# Patient Record
Sex: FEMALE | Race: WHITE | NOT HISPANIC OR LATINO | Employment: OTHER | URBAN - NONMETROPOLITAN AREA
[De-identification: names, ages, dates, MRNs, and addresses within clinical notes are randomized per-mention and may not be internally consistent; named-entity substitution may affect disease eponyms.]

---

## 2018-01-15 ENCOUNTER — TRANSCRIBE ORDERS (OUTPATIENT)
Dept: ADMINISTRATIVE | Facility: HOSPITAL | Age: 68
End: 2018-01-15

## 2018-01-15 DIAGNOSIS — R41.3 MEMORY LOSS: Primary | ICD-10-CM

## 2018-01-16 ENCOUNTER — APPOINTMENT (OUTPATIENT)
Dept: MRI IMAGING | Facility: HOSPITAL | Age: 68
End: 2018-01-16
Attending: PSYCHIATRY & NEUROLOGY

## 2018-01-19 ENCOUNTER — HOSPITAL ENCOUNTER (OUTPATIENT)
Dept: MRI IMAGING | Facility: HOSPITAL | Age: 68
Discharge: HOME | End: 2018-01-19
Attending: PSYCHIATRY & NEUROLOGY | Admitting: PSYCHIATRY & NEUROLOGY

## 2018-01-19 DIAGNOSIS — R41.3 MEMORY LOSS: ICD-10-CM

## 2018-01-19 PROCEDURE — 70551 MRI BRAIN STEM W/O DYE: CPT

## 2018-01-19 PROCEDURE — 70551 MRI BRAIN STEM W/O DYE: CPT | Performed by: RADIOLOGY

## 2018-02-15 ENCOUNTER — APPOINTMENT (OUTPATIENT)
Dept: CT IMAGING | Facility: HOSPITAL | Age: 68
End: 2018-02-15

## 2018-02-15 ENCOUNTER — APPOINTMENT (OUTPATIENT)
Dept: GENERAL RADIOLOGY | Facility: HOSPITAL | Age: 68
End: 2018-02-15

## 2018-02-15 ENCOUNTER — HOSPITAL ENCOUNTER (EMERGENCY)
Facility: HOSPITAL | Age: 68
Discharge: HOME | End: 2018-02-16
Attending: EMERGENCY MEDICINE | Admitting: EMERGENCY MEDICINE

## 2018-02-15 DIAGNOSIS — R11.2 NON-INTRACTABLE VOMITING WITH NAUSEA, UNSPECIFIED VOMITING TYPE: Primary | ICD-10-CM

## 2018-02-15 PROCEDURE — 25010000002 ONDANSETRON PER 1 MG: Performed by: EMERGENCY MEDICINE

## 2018-02-15 PROCEDURE — 96374 THER/PROPH/DIAG INJ IV PUSH: CPT

## 2018-02-15 PROCEDURE — 36415 COLL VENOUS BLD VENIPUNCTURE: CPT

## 2018-02-15 PROCEDURE — 99284 EMERGENCY DEPT VISIT MOD MDM: CPT

## 2018-02-15 PROCEDURE — 93005 ELECTROCARDIOGRAM TRACING: CPT | Performed by: EMERGENCY MEDICINE

## 2018-02-15 PROCEDURE — 93010 ELECTROCARDIOGRAM REPORT: CPT | Performed by: INTERNAL MEDICINE

## 2018-02-15 PROCEDURE — 84484 ASSAY OF TROPONIN QUANT: CPT | Performed by: EMERGENCY MEDICINE

## 2018-02-15 PROCEDURE — 83605 ASSAY OF LACTIC ACID: CPT | Performed by: EMERGENCY MEDICINE

## 2018-02-15 PROCEDURE — 87040 BLOOD CULTURE FOR BACTERIA: CPT | Performed by: EMERGENCY MEDICINE

## 2018-02-15 PROCEDURE — 83690 ASSAY OF LIPASE: CPT | Performed by: EMERGENCY MEDICINE

## 2018-02-15 PROCEDURE — 85025 COMPLETE CBC W/AUTO DIFF WBC: CPT | Performed by: EMERGENCY MEDICINE

## 2018-02-15 PROCEDURE — 81001 URINALYSIS AUTO W/SCOPE: CPT | Performed by: EMERGENCY MEDICINE

## 2018-02-15 PROCEDURE — 96361 HYDRATE IV INFUSION ADD-ON: CPT

## 2018-02-15 PROCEDURE — 87804 INFLUENZA ASSAY W/OPTIC: CPT | Performed by: EMERGENCY MEDICINE

## 2018-02-15 PROCEDURE — 80053 COMPREHEN METABOLIC PANEL: CPT | Performed by: EMERGENCY MEDICINE

## 2018-02-15 PROCEDURE — 82150 ASSAY OF AMYLASE: CPT | Performed by: EMERGENCY MEDICINE

## 2018-02-15 PROCEDURE — 96375 TX/PRO/DX INJ NEW DRUG ADDON: CPT

## 2018-02-15 RX ORDER — PANTOPRAZOLE SODIUM 40 MG/10ML
40 INJECTION, POWDER, LYOPHILIZED, FOR SOLUTION INTRAVENOUS ONCE
Status: COMPLETED | OUTPATIENT
Start: 2018-02-15 | End: 2018-02-15

## 2018-02-15 RX ORDER — SODIUM CHLORIDE 9 MG/ML
INJECTION, SOLUTION INTRAVENOUS
Status: COMPLETED
Start: 2018-02-15 | End: 2018-02-16

## 2018-02-15 RX ORDER — SODIUM CHLORIDE 9 MG/ML
125 INJECTION, SOLUTION INTRAVENOUS CONTINUOUS
Status: DISCONTINUED | OUTPATIENT
Start: 2018-02-15 | End: 2018-02-16 | Stop reason: HOSPADM

## 2018-02-15 RX ORDER — ONDANSETRON 2 MG/ML
4 INJECTION INTRAMUSCULAR; INTRAVENOUS ONCE
Status: COMPLETED | OUTPATIENT
Start: 2018-02-15 | End: 2018-02-15

## 2018-02-15 RX ADMIN — SODIUM CHLORIDE 125 ML/HR: 9 INJECTION, SOLUTION INTRAVENOUS at 23:52

## 2018-02-15 RX ADMIN — PANTOPRAZOLE SODIUM 40 MG: 40 INJECTION, POWDER, FOR SOLUTION INTRAVENOUS at 23:52

## 2018-02-15 RX ADMIN — SODIUM CHLORIDE 1000 ML: 9 INJECTION, SOLUTION INTRAVENOUS at 23:52

## 2018-02-15 RX ADMIN — ONDANSETRON 4 MG: 2 INJECTION, SOLUTION INTRAMUSCULAR; INTRAVENOUS at 23:52

## 2018-02-16 ENCOUNTER — APPOINTMENT (OUTPATIENT)
Dept: CT IMAGING | Facility: HOSPITAL | Age: 68
End: 2018-02-16

## 2018-02-16 ENCOUNTER — APPOINTMENT (OUTPATIENT)
Dept: GENERAL RADIOLOGY | Facility: HOSPITAL | Age: 68
End: 2018-02-16

## 2018-02-16 VITALS
OXYGEN SATURATION: 97 % | HEIGHT: 60 IN | WEIGHT: 86 LBS | BODY MASS INDEX: 16.88 KG/M2 | DIASTOLIC BLOOD PRESSURE: 66 MMHG | SYSTOLIC BLOOD PRESSURE: 115 MMHG | RESPIRATION RATE: 16 BRPM | HEART RATE: 71 BPM | TEMPERATURE: 97.9 F

## 2018-02-16 LAB
ALBUMIN SERPL-MCNC: 4.6 G/DL (ref 3.4–4.8)
ALBUMIN/GLOB SERPL: 1.8 G/DL (ref 1.5–2.5)
ALP SERPL-CCNC: 82 U/L (ref 35–104)
ALT SERPL W P-5'-P-CCNC: 29 U/L (ref 10–36)
AMYLASE SERPL-CCNC: 42 U/L (ref 28–100)
ANION GAP SERPL CALCULATED.3IONS-SCNC: 13.8 MMOL/L (ref 3.6–11.2)
AST SERPL-CCNC: 30 U/L (ref 10–30)
BACTERIA UR QL AUTO: ABNORMAL /HPF
BASOPHILS # BLD AUTO: 0.03 10*3/MM3 (ref 0–0.3)
BASOPHILS NFR BLD AUTO: 0.3 % (ref 0–2)
BILIRUB SERPL-MCNC: 0.6 MG/DL (ref 0.2–1.8)
BILIRUB UR QL STRIP: NEGATIVE
BUN BLD-MCNC: 12 MG/DL (ref 7–21)
BUN/CREAT SERPL: 15.8 (ref 7–25)
CALCIUM SPEC-SCNC: 9.6 MG/DL (ref 7.7–10)
CHLORIDE SERPL-SCNC: 102 MMOL/L (ref 99–112)
CLARITY UR: ABNORMAL
CO2 SERPL-SCNC: 23.2 MMOL/L (ref 24.3–31.9)
COLOR UR: YELLOW
CREAT BLD-MCNC: 0.76 MG/DL (ref 0.43–1.29)
D-LACTATE SERPL-SCNC: 2.7 MMOL/L (ref 0.5–2)
DEPRECATED RDW RBC AUTO: 40.2 FL (ref 37–54)
EOSINOPHIL # BLD AUTO: 0 10*3/MM3 (ref 0–0.7)
EOSINOPHIL NFR BLD AUTO: 0 % (ref 0–7)
ERYTHROCYTE [DISTWIDTH] IN BLOOD BY AUTOMATED COUNT: 12.9 % (ref 11.5–14.5)
FLUAV AG NPH QL: NEGATIVE
FLUBV AG NPH QL IA: NEGATIVE
GFR SERPL CREATININE-BSD FRML MDRD: 76 ML/MIN/1.73
GLOBULIN UR ELPH-MCNC: 2.6 GM/DL
GLUCOSE BLD-MCNC: 143 MG/DL (ref 70–110)
GLUCOSE UR STRIP-MCNC: NEGATIVE MG/DL
HCT VFR BLD AUTO: 43.8 % (ref 37–47)
HGB BLD-MCNC: 14.4 G/DL (ref 12–16)
HGB UR QL STRIP.AUTO: ABNORMAL
HOLD SPECIMEN: NORMAL
HYALINE CASTS UR QL AUTO: ABNORMAL /LPF
IMM GRANULOCYTES # BLD: 0.01 10*3/MM3 (ref 0–0.03)
IMM GRANULOCYTES NFR BLD: 0.1 % (ref 0–0.5)
KETONES UR QL STRIP: ABNORMAL
LEUKOCYTE ESTERASE UR QL STRIP.AUTO: NEGATIVE
LIPASE SERPL-CCNC: 31 U/L (ref 13–60)
LYMPHOCYTES # BLD AUTO: 0.67 10*3/MM3 (ref 1–3)
LYMPHOCYTES NFR BLD AUTO: 5.9 % (ref 16–46)
MCH RBC QN AUTO: 28.6 PG (ref 27–33)
MCHC RBC AUTO-ENTMCNC: 32.9 G/DL (ref 33–37)
MCV RBC AUTO: 87.1 FL (ref 80–94)
MONOCYTES # BLD AUTO: 0.26 10*3/MM3 (ref 0.1–0.9)
MONOCYTES NFR BLD AUTO: 2.3 % (ref 0–12)
NEUTROPHILS # BLD AUTO: 10.33 10*3/MM3 (ref 1.4–6.5)
NEUTROPHILS NFR BLD AUTO: 91.4 % (ref 40–75)
NITRITE UR QL STRIP: NEGATIVE
OSMOLALITY SERPL CALC.SUM OF ELEC: 279.8 MOSM/KG (ref 273–305)
PH UR STRIP.AUTO: 8.5 [PH] (ref 5–8)
PLATELET # BLD AUTO: 228 10*3/MM3 (ref 130–400)
PMV BLD AUTO: 11.6 FL (ref 6–10)
POTASSIUM BLD-SCNC: 4 MMOL/L (ref 3.5–5.3)
PROT SERPL-MCNC: 7.2 G/DL (ref 6–8)
PROT UR QL STRIP: ABNORMAL
RBC # BLD AUTO: 5.03 10*6/MM3 (ref 4.2–5.4)
RBC # UR: ABNORMAL /HPF
REF LAB TEST METHOD: ABNORMAL
SODIUM BLD-SCNC: 139 MMOL/L (ref 135–153)
SP GR UR STRIP: 1.02 (ref 1–1.03)
SQUAMOUS #/AREA URNS HPF: ABNORMAL /HPF
TROPONIN I SERPL-MCNC: <0.006 NG/ML
UROBILINOGEN UR QL STRIP: ABNORMAL
WBC NRBC COR # BLD: 11.3 10*3/MM3 (ref 4.5–12.5)
WBC UR QL AUTO: ABNORMAL /HPF

## 2018-02-16 PROCEDURE — 71045 X-RAY EXAM CHEST 1 VIEW: CPT | Performed by: RADIOLOGY

## 2018-02-16 PROCEDURE — 87040 BLOOD CULTURE FOR BACTERIA: CPT | Performed by: EMERGENCY MEDICINE

## 2018-02-16 PROCEDURE — 74176 CT ABD & PELVIS W/O CONTRAST: CPT | Performed by: RADIOLOGY

## 2018-02-16 PROCEDURE — 36415 COLL VENOUS BLD VENIPUNCTURE: CPT

## 2018-02-16 PROCEDURE — 71045 X-RAY EXAM CHEST 1 VIEW: CPT

## 2018-02-16 PROCEDURE — 74176 CT ABD & PELVIS W/O CONTRAST: CPT

## 2018-02-16 RX ORDER — ONDANSETRON 4 MG/1
4 TABLET, ORALLY DISINTEGRATING ORAL EVERY 6 HOURS PRN
Qty: 12 TABLET | Refills: 0 | Status: ON HOLD | OUTPATIENT
Start: 2018-02-16 | End: 2021-09-06

## 2018-02-16 RX ORDER — ONDANSETRON 4 MG/1
4 TABLET, ORALLY DISINTEGRATING ORAL EVERY 6 HOURS PRN
Status: DISCONTINUED | OUTPATIENT
Start: 2018-02-16 | End: 2018-02-16 | Stop reason: HOSPADM

## 2018-02-16 RX ORDER — AMLODIPINE BESYLATE 10 MG/1
10 TABLET ORAL DAILY
Status: ON HOLD | COMMUNITY
End: 2021-09-06

## 2018-02-16 RX ORDER — DONEPEZIL HYDROCHLORIDE 5 MG/1
5 TABLET, FILM COATED ORAL NIGHTLY
Status: ON HOLD | COMMUNITY
End: 2021-09-06

## 2018-02-16 RX ADMIN — ONDANSETRON 4 MG: 4 TABLET, ORALLY DISINTEGRATING ORAL at 01:40

## 2018-02-16 NOTE — ED PROVIDER NOTES
Subjective   HPI Comments: Patient is a 67-year-old white female who suffers from dementia.  She is brought here by the daughter tonight with a complaint that she had onset at about 5 PM of nausea and vomiting, may have vomited 5-6 times.  The daughter states that the patient was started on Aricept today, which she took concurrently with Norvasc and Trentellix; the daughter thinks that taking these medications simultaneously is what caused the nausea and vomiting.  Patient does suffer from dementia and she is a poor historian.  She complains of feeling nauseated.  She denies any sort of pain.  She denies other symptoms or other complaints.  EMS administered 12 have milligrams of intravenous Phenergan in route to the hospital, patient reports that it has not helped.    Patient is a 67 y.o. female presenting with vomiting.   History provided by: History is mainly from the patient's daughter.  The patient contributes some as well.  Vomiting   The primary symptoms include nausea and vomiting. Primary symptoms do not include fever, abdominal pain, diarrhea, melena, hematemesis, jaundice, hematochezia, dysuria, myalgias or arthralgias.   The illness does not include chills or back pain.       Review of Systems   Constitutional: Negative for chills, diaphoresis and fever.   HENT: Negative for ear pain and sore throat.    Eyes: Negative for photophobia, pain and redness.   Respiratory: Negative for shortness of breath and wheezing.    Cardiovascular: Negative for chest pain and palpitations.   Gastrointestinal: Positive for nausea and vomiting. Negative for abdominal pain, blood in stool, diarrhea, hematemesis, hematochezia, jaundice and melena.   Endocrine: Negative for polydipsia and polyphagia.   Genitourinary: Negative for difficulty urinating, dysuria and flank pain.   Musculoskeletal: Negative for arthralgias, back pain, myalgias, neck pain and neck stiffness.   Skin: Negative for color change and pallor.    Neurological: Negative for seizures, syncope, speech difficulty and headaches.   Psychiatric/Behavioral: Negative for confusion.   All other systems reviewed and are negative.      No past medical history on file.    No Known Allergies    Past Surgical History:   Procedure Laterality Date   • BREAST AUGMENTATION     •  SECTION     • EYE SURGERY Right 2004    prostetic right eye   • GALLBLADDER SURGERY     • HYSTERECTOMY     • TONSILLECTOMY         No family history on file.    Social History     Social History   • Marital status:      Spouse name: N/A   • Number of children: N/A   • Years of education: N/A     Social History Main Topics   • Smoking status: Not on file   • Smokeless tobacco: Not on file   • Alcohol use Not on file   • Drug use: Not on file   • Sexual activity: Not on file     Other Topics Concern   • Not on file     Social History Narrative           Objective   Physical Exam   Constitutional: No distress.   A very pleasant, thin white female who is in no apparent distress.   HENT:   Head: Normocephalic and atraumatic.   Eyes: Right eye exhibits no discharge. Left eye exhibits no discharge. No scleral icterus.   Neck: Normal range of motion. Neck supple. No tracheal deviation present.   Cardiovascular: Normal rate, regular rhythm and intact distal pulses.    Pulmonary/Chest: Effort normal and breath sounds normal. No respiratory distress. She has no wheezes. She has no rales.   Abdominal: Soft. Bowel sounds are normal. She exhibits no distension. There is tenderness (There is only mild epigastric tenderness to palpation.  No other tenderness.  No acute peritoneal signs.). There is no rebound and no guarding.   Musculoskeletal: Normal range of motion. She exhibits no tenderness or deformity.   Neurological: She is alert. She exhibits normal muscle tone.   Patient is oriented to person.  Not quite to place or situation.  Not oriented to time.  This is her usual mental status per the  daughter.   Skin: Skin is warm and dry. She is not diaphoretic. No pallor.   Psychiatric: Her behavior is normal.   Vitals reviewed.      Procedures  EKG shows sinus rhythm with a rate of 74.  No apparent acute ischemia.  XR Chest 1 View   ED Interpretation   No apparent acute disease pending radiologist.      CT Abdomen Pelvis Without Contrast   ED Interpretation   Per virtual radiology, slightly limited evaluation with colonic   decompression.  Suspect mild right-sided and transverse colitis.    No other acute abnormalities are described.        Results for orders placed or performed during the hospital encounter of 02/15/18   Influenza Antigen, Rapid - Swab, Nasopharynx   Result Value Ref Range    Influenza A Ag, EIA Negative Negative    Influenza B Ag, EIA Negative Negative   Comprehensive Metabolic Panel   Result Value Ref Range    Glucose 143 (H) 70 - 110 mg/dL    BUN 12 7 - 21 mg/dL    Creatinine 0.76 0.43 - 1.29 mg/dL    Sodium 139 135 - 153 mmol/L    Potassium 4.0 3.5 - 5.3 mmol/L    Chloride 102 99 - 112 mmol/L    CO2 23.2 (L) 24.3 - 31.9 mmol/L    Calcium 9.6 7.7 - 10.0 mg/dL    Total Protein 7.2 6.0 - 8.0 g/dL    Albumin 4.60 3.40 - 4.80 g/dL    ALT (SGPT) 29 10 - 36 U/L    AST (SGOT) 30 10 - 30 U/L    Alkaline Phosphatase 82 35 - 104 U/L    Total Bilirubin 0.6 0.2 - 1.8 mg/dL    eGFR Non African Amer 76 >60 mL/min/1.73    Globulin 2.6 gm/dL    A/G Ratio 1.8 1.5 - 2.5 g/dL    BUN/Creatinine Ratio 15.8 7.0 - 25.0    Anion Gap 13.8 (H) 3.6 - 11.2 mmol/L   Lipase   Result Value Ref Range    Lipase 31 13 - 60 U/L   Amylase   Result Value Ref Range    Amylase 42 28 - 100 U/L   Urinalysis With / Culture If Indicated - Urine, Catheter   Result Value Ref Range    Color, UA Yellow Yellow, Straw    Appearance, UA Turbid (A) Clear    pH, UA 8.5 (H) 5.0 - 8.0    Specific Gravity, UA 1.022 1.005 - 1.030    Glucose, UA Negative Negative    Ketones, UA 40 mg/dL (2+) (A) Negative    Bilirubin, UA Negative Negative     Blood, UA Trace (A) Negative    Protein,  mg/dL (2+) (A) Negative    Leuk Esterase, UA Negative Negative    Nitrite, UA Negative Negative    Urobilinogen, UA 1.0 E.U./dL 0.2 - 1.0 E.U./dL   Troponin   Result Value Ref Range    Troponin I <0.006 <=0.040 ng/mL   Lactic Acid, Plasma   Result Value Ref Range    Lactate 2.7 (C) 0.5 - 2.0 mmol/L   CBC Auto Differential   Result Value Ref Range    WBC 11.30 4.50 - 12.50 10*3/mm3    RBC 5.03 4.20 - 5.40 10*6/mm3    Hemoglobin 14.4 12.0 - 16.0 g/dL    Hematocrit 43.8 37.0 - 47.0 %    MCV 87.1 80.0 - 94.0 fL    MCH 28.6 27.0 - 33.0 pg    MCHC 32.9 (L) 33.0 - 37.0 g/dL    RDW 12.9 11.5 - 14.5 %    RDW-SD 40.2 37.0 - 54.0 fl    MPV 11.6 (H) 6.0 - 10.0 fL    Platelets 228 130 - 400 10*3/mm3    Neutrophil % 91.4 (H) 40.0 - 75.0 %    Lymphocyte % 5.9 (L) 16.0 - 46.0 %    Monocyte % 2.3 0.0 - 12.0 %    Eosinophil % 0.0 0.0 - 7.0 %    Basophil % 0.3 0.0 - 2.0 %    Immature Grans % 0.1 0.0 - 0.5 %    Neutrophils, Absolute 10.33 (H) 1.40 - 6.50 10*3/mm3    Lymphocytes, Absolute 0.67 (L) 1.00 - 3.00 10*3/mm3    Monocytes, Absolute 0.26 0.10 - 0.90 10*3/mm3    Eosinophils, Absolute 0.00 0.00 - 0.70 10*3/mm3    Basophils, Absolute 0.03 0.00 - 0.30 10*3/mm3    Immature Grans, Absolute 0.01 0.00 - 0.03 10*3/mm3   Urinalysis, Microscopic Only - Urine, Clean Catch   Result Value Ref Range    RBC, UA 3-6 (A) None Seen, 0-2 /HPF    WBC, UA 0-2 None Seen, 0-2 /HPF    Bacteria, UA Trace (A) None Seen /HPF    Squamous Epithelial Cells, UA 3-6 (A) None Seen, 0-2 /HPF    Hyaline Casts, UA None Seen None Seen /LPF    Methodology Manual Light Microscopy    Osmolality, Calculated   Result Value Ref Range    Osmolality Calc 279.8 273.0 - 305.0 mOsm/kg              ED Course  ED Course   Comment By Time   Patient is resting comfortably, voices that she feels much better now.  Her daughter advises that her mother feels much better, looks much better, has returned to normal, and would like to take  her home.  We discussed the test results and her plan of care.  The daughter voices understanding and agreement. Edis Decker MD 02/16 0117                  [facility]    Final diagnoses:   Non-intractable vomiting with nausea, unspecified vomiting type             Please note that portions of this note were completed with a voice recognition program. Efforts were made to edit the dictations, but occasionally words are mistranscribed.       Edis Decker MD  02/16/18 0144

## 2018-02-16 NOTE — ED NOTES
Pt being transported to car by ER tech and asks for zofran to go. Dr. Decker notifed and VORB to give Zofran OTD to go x2. Packaged and sent with Pt. Pt and Pt family verbalized understanding of medication administration.      Opal Bowens RN  02/16/18 [id number]

## 2018-02-16 NOTE — DISCHARGE INSTRUCTIONS
Nausea and Vomiting, Adult  Feeling sick to your stomach (nausea) means that your stomach is upset or you feel like you have to throw up (vomit). Feeling more and more sick to your stomach can lead to throwing up. Throwing up happens when food and liquid from your stomach are thrown up and out the mouth. Throwing up can make you feel weak and cause you to get dehydrated. Dehydration can make you tired and thirsty, make you have a dry mouth, and make it so you pee (urinate) less often. Older adults and people with other diseases or a weak defense system (immune system) are at higher risk for dehydration. If you feel sick to your stomach or if you throw up, it is important to follow instructions from your doctor about how to take care of yourself.  Follow these instructions at home:  Eating and drinking   Follow these instructions as told by your doctor:  · Take an oral rehydration solution (ORS). This is a drink that is sold at pharmacies and stores.  · Drink clear fluids in small amounts as you are able, such as:  ¨ Water.  ¨ Ice chips.  ¨ Diluted fruit juice.  ¨ Low-calorie sports drinks.  · Eat bland, easy-to-digest foods in small amounts as you are able, such as:  ¨ Bananas.  ¨ Applesauce.  ¨ Rice.  ¨ Low-fat (lean) meats.  ¨ Toast.  ¨ Crackers.  · Avoid fluids that have a lot of sugar or caffeine in them.  · Avoid alcohol.  · Avoid spicy or fatty foods.  General instructions   · Drink enough fluid to keep your pee (urine) clear or pale yellow.  · Wash your hands often. If you cannot use soap and water, use hand .  · Make sure that all people in your home wash their hands well and often.  · Take over-the-counter and prescription medicines only as told by your doctor.  · Rest at home while you get better.  · Watch your condition for any changes.  · Breathe slowly and deeply when you feel sick to your stomach.  · Keep all follow-up visits as told by your doctor. This is important.  Contact a doctor  if:  · You have a fever.  · You cannot keep fluids down.  · Your symptoms get worse.  · You have new symptoms.  · You feel sick to your stomach for more than two days.  · You feel light-headed or dizzy.  · You have a headache.  · You have muscle cramps.  Get help right away if:  · You have pain in your chest, neck, arm, or jaw.  · You feel very weak or you pass out (faint).  · You throw up again and again.  · You see blood in your throw-up.  · Your throw-up looks like black coffee grounds.  · You have bloody or black poop (stools) or poop that look like tar.  · You have a very bad headache, a stiff neck, or both.  · You have a rash.  · You have very bad pain, cramping, or bloating in your belly (abdomen).  · You have trouble breathing.  · You are breathing very quickly.  · Your heart is beating very quickly.  · Your skin feels cold and clammy.  · You feel confused.  · You have pain when you pee.  · You have signs of dehydration, such as:  ¨ Dark pee, hardly any pee, or no pee.  ¨ Cracked lips.  ¨ Dry mouth.  ¨ Sunken eyes.  ¨ Sleepiness.  ¨ Weakness.  These symptoms may be an emergency. Do not wait to see if the symptoms will go away. Get medical help right away. Call your local emergency services (911 in the [place] Do not drive yourself to the hospital.  This information is not intended to replace advice given to you by your health care provider. Make sure you discuss any questions you have with your health care provider.  Document Released: 06/05/2009 Document Revised: 07/07/2017 Document Reviewed: 08/23/2016  [employer] Interactive Patient Education © 2017 [employer]  Home in care of daughter.  Rest, plenty of fluids.  Advance diet as tolerated.  Zofran as directed as needed.  Follow-up with Abraham Garza in the office in 2 days.  Return the emergency Department right away if symptoms worsen/any problems.    Hypertension  Hypertension, commonly called high blood pressure, is when the force of blood pumping  "through the arteries is too strong. The arteries are the blood vessels that carry blood from the heart throughout the body. Hypertension forces the heart to work harder to pump blood and may cause arteries to become narrow or stiff. Having untreated or uncontrolled hypertension can cause heart attacks, strokes, kidney disease, and other problems.  A blood pressure reading consists of a higher number over a lower number. Ideally, your blood pressure should be below 120/80. The first (\"top\") number is called the systolic pressure. It is a measure of the pressure in your arteries as your heart beats. The second (\"bottom\") number is called the diastolic pressure. It is a measure of the pressure in your arteries as the heart relaxes.  What are the causes?  The cause of this condition is not known.  What increases the risk?  Some risk factors for high blood pressure are under your control. Others are not.  Factors you can change   · Smoking.  · Having type 2 diabetes mellitus, high cholesterol, or both.  · Not getting enough exercise or physical activity.  · Being overweight.  · Having too much fat, sugar, calories, or salt (sodium) in your diet.  · Drinking too much alcohol.  Factors that are difficult or impossible to change   · Having chronic kidney disease.  · Having a family history of high blood pressure.  · Age. Risk increases with age.  · Race. You may be at higher risk if you are -American.  · Gender. Men are at higher risk than women before age 45. After age 65, women are at higher risk than men.  · Having obstructive sleep apnea.  · Stress.  What are the signs or symptoms?  Extremely high blood pressure (hypertensive crisis) may cause:  · Headache.  · Anxiety.  · Shortness of breath.  · Nosebleed.  · Nausea and vomiting.  · Severe chest pain.  · Jerky movements you cannot control (seizures).  How is this diagnosed?  This condition is diagnosed by measuring your blood pressure while you are seated, with " your arm resting on a surface. The cuff of the blood pressure monitor will be placed directly against the skin of your upper arm at the level of your heart. It should be measured at least twice using the same arm. Certain conditions can cause a difference in blood pressure between your right and left arms.  Certain factors can cause blood pressure readings to be lower or higher than normal (elevated) for a short period of time:  · When your blood pressure is higher when you are in a health care provider's office than when you are at home, this is called white coat hypertension. Most people with this condition do not need medicines.  · When your blood pressure is higher at home than when you are in a health care provider's office, this is called masked hypertension. Most people with this condition may need medicines to control blood pressure.  If you have a high blood pressure reading during one visit or you have normal blood pressure with other risk factors:  · You may be asked to return on a different day to have your blood pressure checked again.  · You may be asked to monitor your blood pressure at home for 1 week or longer.  If you are diagnosed with hypertension, you may have other blood or imaging tests to help your health care provider understand your overall risk for other conditions.  How is this treated?  This condition is treated by making healthy lifestyle changes, such as eating healthy foods, exercising more, and reducing your alcohol intake. Your health care provider may prescribe medicine if lifestyle changes are not enough to get your blood pressure under control, and if:  · Your systolic blood pressure is above 130.  · Your diastolic blood pressure is above 80.  Your personal target blood pressure may vary depending on your medical conditions, your age, and other factors.  Follow these instructions at home:  Eating and drinking   · Eat a diet that is high in fiber and potassium, and low in sodium,  added sugar, and fat. An example eating plan is called the DASH (Dietary Approaches to Stop Hypertension) diet. To eat this way:  ¨ Eat plenty of fresh fruits and vegetables. Try to fill half of your plate at each meal with fruits and vegetables.  ¨ Eat whole grains, such as whole wheat pasta, brown rice, or whole grain bread. Fill about one quarter of your plate with whole grains.  ¨ Eat or drink low-fat dairy products, such as skim milk or low-fat yogurt.  ¨ Avoid fatty cuts of meat, processed or cured meats, and poultry with skin. Fill about one quarter of your plate with lean proteins, such as fish, chicken without skin, beans, eggs, and tofu.  ¨ Avoid premade and processed foods. These tend to be higher in sodium, added sugar, and fat.  · Reduce your daily sodium intake. Most people with hypertension should eat less than 1,500 mg of sodium a day.  · Limit alcohol intake to no more than 1 drink a day for nonpregnant women and 2 drinks a day for men. One drink equals 12 oz of beer, 5 oz of wine, or 1½ oz of hard liquor.  Lifestyle   · Work with your health care provider to maintain a healthy body weight or to lose weight. Ask what an ideal weight is for you.  · Get at least 30 minutes of exercise that causes your heart to beat faster (aerobic exercise) most days of the week. Activities may include walking, swimming, or biking.  · Include exercise to strengthen your muscles (resistance exercise), such as pilates or lifting weights, as part of your weekly exercise routine. Try to do these types of exercises for 30 minutes at least 3 days a week.  · Do not use any products that contain nicotine or tobacco, such as cigarettes and e-cigarettes. If you need help quitting, ask your health care provider.  · Monitor your blood pressure at home as told by your health care provider.  · Keep all follow-up visits as told by your health care provider. This is important.  Medicines   · Take over-the-counter and prescription  medicines only as told by your health care provider. Follow directions carefully. Blood pressure medicines must be taken as prescribed.  · Do not skip doses of blood pressure medicine. Doing this puts you at risk for problems and can make the medicine less effective.  · Ask your health care provider about side effects or reactions to medicines that you should watch for.  Contact a health care provider if:  · You think you are having a reaction to a medicine you are taking.  · You have headaches that keep coming back (recurring).  · You feel dizzy.  · You have swelling in your ankles.  · You have trouble with your vision.  Get help right away if:  · You develop a severe headache or confusion.  · You have unusual weakness or numbness.  · You feel faint.  · You have severe pain in your chest or abdomen.  · You vomit repeatedly.  · You have trouble breathing.  Summary  · Hypertension is when the force of blood pumping through your arteries is too strong. If this condition is not controlled, it may put you at risk for serious complications.  · Your personal target blood pressure may vary depending on your medical conditions, your age, and other factors. For most people, a normal blood pressure is less than 120/80.  · Hypertension is treated with lifestyle changes, medicines, or a combination of both. Lifestyle changes include weight loss, eating a healthy, low-sodium diet, exercising more, and limiting alcohol.  This information is not intended to replace advice given to you by your health care provider. Make sure you discuss any questions you have with your health care provider.  Document Released: 12/18/2006 Document Revised: 11/15/2017 Document Reviewed: 11/15/2017  [employer] Interactive Patient Education © 2017 [employer]

## 2018-02-21 LAB
BACTERIA SPEC AEROBE CULT: NORMAL
BACTERIA SPEC AEROBE CULT: NORMAL

## 2018-11-07 ENCOUNTER — TRANSCRIBE ORDERS (OUTPATIENT)
Dept: ADMINISTRATIVE | Facility: HOSPITAL | Age: 68
End: 2018-11-07

## 2018-11-07 DIAGNOSIS — Z87.891 PERSONAL HISTORY OF NICOTINE DEPENDENCE: Primary | ICD-10-CM

## 2018-11-14 ENCOUNTER — HOSPITAL ENCOUNTER (OUTPATIENT)
Dept: CT IMAGING | Facility: HOSPITAL | Age: 68
Discharge: HOME | End: 2018-11-14
Admitting: NURSE PRACTITIONER

## 2018-11-14 DIAGNOSIS — Z87.891 PERSONAL HISTORY OF NICOTINE DEPENDENCE: ICD-10-CM

## 2018-11-14 PROCEDURE — G0297 LDCT FOR LUNG CA SCREEN: HCPCS | Performed by: RADIOLOGY

## 2018-11-14 PROCEDURE — G0297 LDCT FOR LUNG CA SCREEN: HCPCS

## 2018-12-14 ENCOUNTER — APPOINTMENT (OUTPATIENT)
Dept: MAMMOGRAPHY | Facility: HOSPITAL | Age: 68
End: 2018-12-14

## 2018-12-14 ENCOUNTER — APPOINTMENT (OUTPATIENT)
Dept: BONE DENSITY | Facility: HOSPITAL | Age: 68
End: 2018-12-14

## 2019-01-15 ENCOUNTER — HOSPITAL ENCOUNTER (OUTPATIENT)
Dept: BONE DENSITY | Facility: HOSPITAL | Age: 69
Discharge: HOME | End: 2019-01-15

## 2019-01-15 ENCOUNTER — HOSPITAL ENCOUNTER (OUTPATIENT)
Dept: MAMMOGRAPHY | Facility: HOSPITAL | Age: 69
Discharge: HOME | End: 2019-01-15
Admitting: NURSE PRACTITIONER

## 2019-01-15 DIAGNOSIS — Z12.39 SCREENING BREAST EXAMINATION: ICD-10-CM

## 2019-01-15 DIAGNOSIS — M81.0 AGE-RELATED OSTEOPOROSIS WITHOUT CURRENT PATHOLOGICAL FRACTURE: ICD-10-CM

## 2019-01-15 PROCEDURE — 77063 BREAST TOMOSYNTHESIS BI: CPT

## 2019-01-15 PROCEDURE — 77067 SCR MAMMO BI INCL CAD: CPT | Performed by: RADIOLOGY

## 2019-01-15 PROCEDURE — 77063 BREAST TOMOSYNTHESIS BI: CPT | Performed by: RADIOLOGY

## 2019-01-15 PROCEDURE — 77067 SCR MAMMO BI INCL CAD: CPT

## 2019-06-10 ENCOUNTER — LAB (OUTPATIENT)
Dept: LAB | Facility: HOSPITAL | Age: 69
End: 2019-06-10

## 2019-06-10 ENCOUNTER — TRANSCRIBE ORDERS (OUTPATIENT)
Dept: ADMINISTRATIVE | Facility: HOSPITAL | Age: 69
End: 2019-06-10

## 2019-06-10 DIAGNOSIS — F32.9 DEPRESSION, REACTIVE: ICD-10-CM

## 2019-06-10 DIAGNOSIS — F32.9 DEPRESSION, REACTIVE: Primary | ICD-10-CM

## 2019-06-10 LAB
HCYS SERPL-MCNC: 9.1 UMOL/L (ref 0–15)
TSH SERPL DL<=0.05 MIU/L-ACNC: 1.65 MIU/ML (ref 0.27–4.2)
VIT B12 BLD-MCNC: >2000 PG/ML (ref 211–946)

## 2019-06-10 PROCEDURE — 82607 VITAMIN B-12: CPT

## 2019-06-10 PROCEDURE — 84443 ASSAY THYROID STIM HORMONE: CPT

## 2019-06-10 PROCEDURE — 36415 COLL VENOUS BLD VENIPUNCTURE: CPT

## 2019-06-10 PROCEDURE — 83090 ASSAY OF HOMOCYSTEINE: CPT

## 2019-09-01 ENCOUNTER — APPOINTMENT (OUTPATIENT)
Dept: GENERAL RADIOLOGY | Facility: HOSPITAL | Age: 69
End: 2019-09-01

## 2019-09-01 ENCOUNTER — HOSPITAL ENCOUNTER (EMERGENCY)
Facility: HOSPITAL | Age: 69
Discharge: HOME | End: 2019-09-01
Attending: EMERGENCY MEDICINE | Admitting: EMERGENCY MEDICINE

## 2019-09-01 VITALS
SYSTOLIC BLOOD PRESSURE: 116 MMHG | HEART RATE: 71 BPM | OXYGEN SATURATION: 93 % | BODY MASS INDEX: 16.3 KG/M2 | DIASTOLIC BLOOD PRESSURE: 65 MMHG | WEIGHT: 83 LBS | HEIGHT: 60 IN | RESPIRATION RATE: 20 BRPM | TEMPERATURE: 98.4 F

## 2019-09-01 DIAGNOSIS — R09.89 CHOKING EPISODE: Primary | ICD-10-CM

## 2019-09-01 LAB
ALBUMIN SERPL-MCNC: 4.12 G/DL (ref 3.5–5.2)
ALBUMIN/GLOB SERPL: 1.5 G/DL
ALP SERPL-CCNC: 79 U/L (ref 39–117)
ALT SERPL W P-5'-P-CCNC: 26 U/L (ref 1–33)
ANION GAP SERPL CALCULATED.3IONS-SCNC: 13 MMOL/L (ref 5–15)
AST SERPL-CCNC: 22 U/L (ref 1–32)
BASOPHILS # BLD AUTO: 0.02 10*3/MM3 (ref 0–0.2)
BASOPHILS NFR BLD AUTO: 0.4 % (ref 0–1.5)
BILIRUB SERPL-MCNC: 0.4 MG/DL (ref 0.2–1.2)
BUN BLD-MCNC: 17 MG/DL (ref 8–23)
BUN/CREAT SERPL: 21.3 (ref 7–25)
CALCIUM SPEC-SCNC: 9.4 MG/DL (ref 8.6–10.5)
CHLORIDE SERPL-SCNC: 107 MMOL/L (ref 98–107)
CO2 SERPL-SCNC: 24 MMOL/L (ref 22–29)
CREAT BLD-MCNC: 0.8 MG/DL (ref 0.57–1)
DEPRECATED RDW RBC AUTO: 43.2 FL (ref 37–54)
EOSINOPHIL # BLD AUTO: 0.04 10*3/MM3 (ref 0–0.4)
EOSINOPHIL NFR BLD AUTO: 0.8 % (ref 0.3–6.2)
ERYTHROCYTE [DISTWIDTH] IN BLOOD BY AUTOMATED COUNT: 13.3 % (ref 12.3–15.4)
GFR SERPL CREATININE-BSD FRML MDRD: 72 ML/MIN/1.73
GLOBULIN UR ELPH-MCNC: 2.7 GM/DL
GLUCOSE BLD-MCNC: 88 MG/DL (ref 65–99)
HCT VFR BLD AUTO: 41.8 % (ref 34–46.6)
HGB BLD-MCNC: 13.6 G/DL (ref 12–15.9)
IMM GRANULOCYTES # BLD AUTO: 0.01 10*3/MM3 (ref 0–0.05)
IMM GRANULOCYTES NFR BLD AUTO: 0.2 % (ref 0–0.5)
LYMPHOCYTES # BLD AUTO: 1.22 10*3/MM3 (ref 0.7–3.1)
LYMPHOCYTES NFR BLD AUTO: 23.4 % (ref 19.6–45.3)
MCH RBC QN AUTO: 29.4 PG (ref 26.6–33)
MCHC RBC AUTO-ENTMCNC: 32.5 G/DL (ref 31.5–35.7)
MCV RBC AUTO: 90.3 FL (ref 79–97)
MONOCYTES # BLD AUTO: 0.43 10*3/MM3 (ref 0.1–0.9)
MONOCYTES NFR BLD AUTO: 8.3 % (ref 5–12)
NEUTROPHILS # BLD AUTO: 3.49 10*3/MM3 (ref 1.7–7)
NEUTROPHILS NFR BLD AUTO: 66.9 % (ref 42.7–76)
PLATELET # BLD AUTO: 259 10*3/MM3 (ref 140–450)
PMV BLD AUTO: 11.4 FL (ref 6–12)
POTASSIUM BLD-SCNC: 4.1 MMOL/L (ref 3.5–5.2)
PROT SERPL-MCNC: 6.8 G/DL (ref 6–8.5)
RBC # BLD AUTO: 4.63 10*6/MM3 (ref 3.77–5.28)
SODIUM BLD-SCNC: 144 MMOL/L (ref 136–145)
TROPONIN T SERPL-MCNC: <0.01 NG/ML (ref 0–0.03)
WBC NRBC COR # BLD: 5.21 10*3/MM3 (ref 3.4–10.8)

## 2019-09-01 PROCEDURE — 70360 X-RAY EXAM OF NECK: CPT

## 2019-09-01 PROCEDURE — 96374 THER/PROPH/DIAG INJ IV PUSH: CPT

## 2019-09-01 PROCEDURE — 85025 COMPLETE CBC W/AUTO DIFF WBC: CPT | Performed by: EMERGENCY MEDICINE

## 2019-09-01 PROCEDURE — 25010000002 METHYLPREDNISOLONE PER 40 MG: Performed by: EMERGENCY MEDICINE

## 2019-09-01 PROCEDURE — 96361 HYDRATE IV INFUSION ADD-ON: CPT

## 2019-09-01 PROCEDURE — 84484 ASSAY OF TROPONIN QUANT: CPT | Performed by: EMERGENCY MEDICINE

## 2019-09-01 PROCEDURE — 93005 ELECTROCARDIOGRAM TRACING: CPT | Performed by: EMERGENCY MEDICINE

## 2019-09-01 PROCEDURE — 99284 EMERGENCY DEPT VISIT MOD MDM: CPT

## 2019-09-01 PROCEDURE — 71046 X-RAY EXAM CHEST 2 VIEWS: CPT

## 2019-09-01 PROCEDURE — 93010 ELECTROCARDIOGRAM REPORT: CPT | Performed by: INTERNAL MEDICINE

## 2019-09-01 PROCEDURE — 80053 COMPREHEN METABOLIC PANEL: CPT | Performed by: EMERGENCY MEDICINE

## 2019-09-01 RX ORDER — SODIUM CHLORIDE 9 MG/ML
125 INJECTION, SOLUTION INTRAVENOUS CONTINUOUS
Status: DISCONTINUED | OUTPATIENT
Start: 2019-09-01 | End: 2019-09-01 | Stop reason: HOSPADM

## 2019-09-01 RX ORDER — METHYLPREDNISOLONE SODIUM SUCCINATE 40 MG/ML
80 INJECTION, POWDER, LYOPHILIZED, FOR SOLUTION INTRAMUSCULAR; INTRAVENOUS ONCE
Status: COMPLETED | OUTPATIENT
Start: 2019-09-01 | End: 2019-09-01

## 2019-09-01 RX ADMIN — METHYLPREDNISOLONE SODIUM SUCCINATE 80 MG: 40 INJECTION, POWDER, FOR SOLUTION INTRAMUSCULAR; INTRAVENOUS at 14:43

## 2019-09-01 RX ADMIN — SODIUM CHLORIDE 125 ML/HR: 9 INJECTION, SOLUTION INTRAVENOUS at 14:43

## 2019-09-01 NOTE — DISCHARGE INSTRUCTIONS
Home in care of daughter.  Daughter to watch patient very closely.  Follow-up with Noemi Tracy in the office on Tuesday.  Return to the emergency department immediately if symptoms worsen/any problems.

## 2019-09-01 NOTE — ED PROVIDER NOTES
"Subjective   Patient is a 69-year-old female who presents after a choking episode.  Daughter gives much of the history, states that her mother has dementia and that she is currently at her baseline mental status.  Patient had been eating a hotdog, took the last bite and got choked on it.  She was able to breathe, was not able to swallow.  Daughter states that this episode lasted as long as 2 or 3 minutes.  Patient states that the bite of hot dog eventually went on down.  Daughter states that she did \"cough up a few small chunks of it\", but that the vast majority of it seemed to pass on down into her stomach.  Patient voices that she now feels completely back to normal.  She had been in her usual state of health, had not been having any acute symptoms prior to this episode.  Patient reports that she is now completely asymptomatic.            Review of Systems   Constitutional: Negative for chills, diaphoresis and fever.   HENT: Negative for ear pain and sore throat.    Eyes: Negative for photophobia and pain.   Respiratory: Negative for shortness of breath and wheezing.    Cardiovascular: Negative for chest pain and palpitations.   Gastrointestinal: Negative for abdominal distention, abdominal pain, blood in stool, diarrhea, nausea and vomiting.   Endocrine: Negative for polydipsia and polyphagia.   Genitourinary: Negative for difficulty urinating and flank pain.   Musculoskeletal: Negative for back pain, neck pain and neck stiffness.   Skin: Negative for color change and pallor.   Neurological: Negative for seizures, syncope and speech difficulty.   Psychiatric/Behavioral: Negative for confusion.   All other systems reviewed and are negative.      Past Medical History:   Diagnosis Date   • Dementia        No Known Allergies    Past Surgical History:   Procedure Laterality Date   • AUGMENTATION MAMMAPLASTY Bilateral    • BREAST AUGMENTATION     •  SECTION     • EYE SURGERY Right 2004    prostetic right " eye   • GALLBLADDER SURGERY     • HYSTERECTOMY     • TONSILLECTOMY         Family History   Problem Relation Age of Onset   • Breast cancer Maternal Aunt        Social History     Socioeconomic History   • Marital status:      Spouse name: Not on file   • Number of children: Not on file   • Years of education: Not on file   • Highest education level: Not on file   Tobacco Use   • Smoking status: Never Smoker   • Smokeless tobacco: Never Used           Objective   Physical Exam   Constitutional: She appears well-developed and well-nourished. No distress.   Very pleasant female, appears demented.  She is in good spirits.  She is in no apparent distress.  She appears comfortable.  Pulse ox is 100% on room air during the exam.   HENT:   Head: Normocephalic and atraumatic.   Eyes: EOM are normal. Pupils are equal, round, and reactive to light. No scleral icterus.   Neck: Normal range of motion. Neck supple. No neck rigidity. No tracheal deviation present.   Cardiovascular: Normal rate, regular rhythm and intact distal pulses.   Pulmonary/Chest: Effort normal and breath sounds normal. No respiratory distress. She exhibits no tenderness.   Abdominal: Soft. Bowel sounds are normal. There is no tenderness. There is no rebound and no guarding.   Musculoskeletal: Normal range of motion. She exhibits no tenderness.   Neurological: She is alert. She has normal strength. No cranial nerve deficit or sensory deficit. She exhibits normal muscle tone. GCS eye subscore is 4. GCS verbal subscore is 5. GCS motor subscore is 6.   Skin: Skin is warm and dry. Capillary refill takes less than 2 seconds. She is not diaphoretic. No cyanosis. No pallor.   Psychiatric: She has a normal mood and affect. Her behavior is normal.   Nursing note and vitals reviewed.      Procedures  EKG shows sinus rhythm with a rate of 70.  No apparent acute ischemia.  XR Neck Soft Tissue   Final Result   Unremarkable neck soft tissue series.      Signer  Name: Octavio Sotelo MD    Signed: 9/1/2019 4:16 PM    Workstation Name: [facility]     [facility]      XR Chest 2 View   Final Result   No acute cardiopulmonary findings.      Signer Name: Octavio Sotelo MD    Signed: 9/1/2019 4:12 PM    Workstation Name: [id number]     [facility]        Results for orders placed or performed during the hospital encounter of 09/01/19   Comprehensive Metabolic Panel   Result Value Ref Range    Glucose 88 65 - 99 mg/dL    BUN 17 8 - 23 mg/dL    Creatinine 0.80 0.57 - 1.00 mg/dL    Sodium 144 136 - 145 mmol/L    Potassium 4.1 3.5 - 5.2 mmol/L    Chloride 107 98 - 107 mmol/L    CO2 24.0 22.0 - 29.0 mmol/L    Calcium 9.4 8.6 - 10.5 mg/dL    Total Protein 6.8 6.0 - 8.5 g/dL    Albumin 4.12 3.50 - 5.20 g/dL    ALT (SGPT) 26 1 - 33 U/L    AST (SGOT) 22 1 - 32 U/L    Alkaline Phosphatase 79 39 - 117 U/L    Total Bilirubin 0.4 0.2 - 1.2 mg/dL    eGFR Non African Amer 72 >60 mL/min/1.73    Globulin 2.7 gm/dL    A/G Ratio 1.5 g/dL    BUN/Creatinine Ratio 21.3 7.0 - 25.0    Anion Gap 13.0 5.0 - 15.0 mmol/L   Troponin   Result Value Ref Range    Troponin T <0.010 0.000 - 0.030 ng/mL   CBC Auto Differential   Result Value Ref Range    WBC 5.21 3.40 - 10.80 10*3/mm3    RBC 4.63 3.77 - 5.28 10*6/mm3    Hemoglobin 13.6 12.0 - 15.9 g/dL    Hematocrit 41.8 34.0 - 46.6 %    MCV 90.3 79.0 - 97.0 fL    MCH 29.4 26.6 - 33.0 pg    MCHC 32.5 31.5 - 35.7 g/dL    RDW 13.3 12.3 - 15.4 %    RDW-SD 43.2 37.0 - 54.0 fl    MPV 11.4 6.0 - 12.0 fL    Platelets 259 140 - 450 10*3/mm3    Neutrophil % 66.9 42.7 - 76.0 %    Lymphocyte % 23.4 19.6 - 45.3 %    Monocyte % 8.3 5.0 - 12.0 %    Eosinophil % 0.8 0.3 - 6.2 %    Basophil % 0.4 0.0 - 1.5 %    Immature Grans % 0.2 0.0 - 0.5 %    Neutrophils, Absolute 3.49 1.70 - 7.00 10*3/mm3    Lymphocytes, Absolute 1.22 0.70 - 3.10 10*3/mm3    Monocytes, Absolute 0.43 0.10 - 0.90 10*3/mm3    Eosinophils, Absolute 0.04 0.00 -  0.40 10*3/mm3    Basophils, Absolute 0.02 0.00 - 0.20 10*3/mm3    Immature Grans, Absolute 0.01 0.00 - 0.05 10*3/mm3                ED Course  ED Course as of Sep 01 1739   Sun Sep 01, 2019   1737 Department stay has been uneventful.  Never has she shown any signs of distress.  Her pulse ox has been 98 to 100% on room air throughout her stay.  She voices that she feels completely normal and she wants to go home.  Her daughter, with whom she lives, voices that she is doing well and that she wants to take her home.  She will watch her closely.  She will bring her back to the emergency department immediately if any problems.  Patient will follow up with her PCP Noemi Tracy in the office on Tuesday.  [CM]      ED Course User Index  [CM] Edis Decker MD                  [facility]      Final diagnoses:   Choking episode             Please note that portions of this note were completed with a voice recognition program. Efforts were made to edit the dictations, but occasionally words are mistranscribed.       Edis Decker MD  09/01/19 8399

## 2020-10-01 ENCOUNTER — HOSPITAL ENCOUNTER (EMERGENCY)
Facility: HOSPITAL | Age: 70
Discharge: HOME | End: 2020-10-01
Attending: FAMILY MEDICINE | Admitting: FAMILY MEDICINE

## 2020-10-01 ENCOUNTER — APPOINTMENT (OUTPATIENT)
Dept: CT IMAGING | Facility: HOSPITAL | Age: 70
End: 2020-10-01

## 2020-10-01 ENCOUNTER — APPOINTMENT (OUTPATIENT)
Dept: GENERAL RADIOLOGY | Facility: HOSPITAL | Age: 70
End: 2020-10-01

## 2020-10-01 VITALS
SYSTOLIC BLOOD PRESSURE: 139 MMHG | WEIGHT: 80 LBS | TEMPERATURE: 97.8 F | DIASTOLIC BLOOD PRESSURE: 67 MMHG | HEART RATE: 73 BPM | OXYGEN SATURATION: 96 % | RESPIRATION RATE: 18 BRPM | HEIGHT: 60 IN | BODY MASS INDEX: 15.71 KG/M2

## 2020-10-01 DIAGNOSIS — S00.03XA HEMATOMA OF SCALP, INITIAL ENCOUNTER: Primary | ICD-10-CM

## 2020-10-01 DIAGNOSIS — M25.521 RIGHT ELBOW PAIN: ICD-10-CM

## 2020-10-01 PROCEDURE — 73080 X-RAY EXAM OF ELBOW: CPT

## 2020-10-01 PROCEDURE — 99283 EMERGENCY DEPT VISIT LOW MDM: CPT

## 2020-10-01 PROCEDURE — 70450 CT HEAD/BRAIN W/O DYE: CPT

## 2020-10-01 PROCEDURE — 72125 CT NECK SPINE W/O DYE: CPT

## 2020-10-01 NOTE — ED PROVIDER NOTES
Subjective   Patient is a 70-year-old female with history of dementia that presents the ED after sustaining a fall at home.  Her daughter accompanies her and lives with her.  She states this evening she was walking through the kitchen at about 2330 when she tripped and fell.  Her granddaughter saw her fall and states that she fell hitting the front of her head and then landed on the right elbow.  Her daughter states she has had no worsening confusion and denies loss of consciousness.  She does state that she had a large knot come up on the right side of her forehead just above the eyebrow.  She states that she is not had any nausea or vomiting.      History provided by:  Patient   used: No    Fall  Mechanism of injury: fall    Injury location:  Head/neck  Head/neck injury location:  Head  Incident location:  Home  Time since incident:  3 hours  Fall:     Fall occurred:  Walking and tripped    Impact surface:  Hard floor    Point of impact:  Head    Entrapped after fall: no    Suspicion of alcohol use: no    Suspicion of drug use: no    Tetanus status:  Unknown  Associated symptoms: no abdominal pain, no back pain, no blindness, no chest pain, no difficulty breathing, no headaches, no hearing loss, no loss of consciousness, no nausea, no neck pain, no seizures and no vomiting        Review of Systems   Constitutional: Negative.  Negative for fever.   HENT: Negative.  Negative for hearing loss.    Eyes: Negative for blindness.   Respiratory: Negative.    Cardiovascular: Negative.  Negative for chest pain.   Gastrointestinal: Negative.  Negative for abdominal pain, nausea and vomiting.   Endocrine: Negative.    Genitourinary: Negative.  Negative for dysuria.   Musculoskeletal: Negative for back pain and neck pain.   Skin: Negative.    Neurological: Negative.  Negative for seizures, loss of consciousness and headaches.   Psychiatric/Behavioral: Negative.    All other systems reviewed and are  negative.      Past Medical History:   Diagnosis Date   • Dementia        No Known Allergies    Past Surgical History:   Procedure Laterality Date   • AUGMENTATION MAMMAPLASTY Bilateral 2005   • BREAST AUGMENTATION     •  SECTION     • EYE SURGERY Right 2004    prostetic right eye   • GALLBLADDER SURGERY     • HYSTERECTOMY     • TONSILLECTOMY         Family History   Problem Relation Age of Onset   • Breast cancer Maternal Aunt        Social History     Socioeconomic History   • Marital status:      Spouse name: Not on file   • Number of children: Not on file   • Years of education: Not on file   • Highest education level: Not on file   Tobacco Use   • Smoking status: Never Smoker   • Smokeless tobacco: Never Used           Objective   Physical Exam  Vitals signs and nursing note reviewed.   Constitutional:       General: She is not in acute distress.     Appearance: She is well-developed. She is not diaphoretic.   HENT:      Head: Normocephalic and atraumatic.        Right Ear: External ear normal.      Left Ear: External ear normal.      Nose: Nose normal.   Eyes:      Conjunctiva/sclera: Conjunctivae normal.      Pupils: Pupils are equal, round, and reactive to light.   Neck:      Musculoskeletal: Normal range of motion and neck supple.      Vascular: No JVD.      Trachea: No tracheal deviation.   Cardiovascular:      Rate and Rhythm: Normal rate and regular rhythm.      Heart sounds: Normal heart sounds. No murmur.   Pulmonary:      Effort: Pulmonary effort is normal. No respiratory distress.      Breath sounds: Normal breath sounds. No wheezing.   Abdominal:      General: Bowel sounds are normal.      Palpations: Abdomen is soft.      Tenderness: There is no abdominal tenderness.   Musculoskeletal: Normal range of motion.         General: No deformity.      Right elbow: She exhibits normal range of motion, no swelling, no effusion, no deformity and no laceration. Tenderness found.   Skin:      General: Skin is warm and dry.      Coloration: Skin is not pale.      Findings: No erythema or rash.   Neurological:      Mental Status: She is alert and oriented to person, place, and time.      Cranial Nerves: No cranial nerve deficit.   Psychiatric:         Behavior: Behavior normal.         Thought Content: Thought content normal.         Procedures           ED Course  ED Course as of Oct 01 0449   u Oct 01, 2020   0242 IMPRESSION:  Negative right elbow.     Signer Name: Frederick Greer MD   Signed: 10/1/2020 2:29 AM   XR Elbow 3+ View Right [MH]   0355 IMPRESSION:  1. No acute intracranial abnormality.  2. Small right anterior frontal scalp hematoma. No skull fracture.  3. Diffuse chronic changes as noted above.     Signer Name: Frederick Greer MD   Signed: 10/1/2020 3:46 AM   CT Head Without Contrast [MH]   0357 IMPRESSION:  1. No acute osseous abnormality.  2. Degenerative disc disease at C5-6.     Signer Name: Frederick Greer MD   Signed: 10/1/2020 3:55 AM   CT Cervical Spine Without Contrast [MH]   0445 Discussed plan of care with patient's daughter and with patient.  Advised if symptoms worsen return to the ED.  Advised to follow-up with PCP in 1 to 2 days.    [MH]      ED Course User Index  [MH] Lashonda Martin PA-C                                           [facility]    Final diagnoses:   Hematoma of scalp, initial encounter   Right elbow pain            Lashonda Martin PA-C  10/01/20 0449

## 2020-10-02 ENCOUNTER — PATIENT OUTREACH (OUTPATIENT)
Dept: CASE MANAGEMENT | Facility: OTHER | Age: 70
End: 2020-10-02

## 2020-10-02 NOTE — OUTREACH NOTE
Patient Outreach Note    Patient was seen in the ED yesterday after a fall at home. RN-ACM spoke with patient's daughter/cg who reports patient is doing well. AVS instructions reviewed including 24/7 nurse line number, cg voiced understanding.     Karissa Sanchez RN  Ambulatory     10/2/2020, 13:33 EDT

## 2021-08-23 ENCOUNTER — APPOINTMENT (OUTPATIENT)
Dept: GENERAL RADIOLOGY | Facility: HOSPITAL | Age: 71
End: 2021-08-23

## 2021-08-23 ENCOUNTER — HOSPITAL ENCOUNTER (EMERGENCY)
Facility: HOSPITAL | Age: 71
Discharge: HOME | End: 2021-08-24
Attending: STUDENT IN AN ORGANIZED HEALTH CARE EDUCATION/TRAINING PROGRAM | Admitting: STUDENT IN AN ORGANIZED HEALTH CARE EDUCATION/TRAINING PROGRAM

## 2021-08-23 DIAGNOSIS — J44.1 COPD EXACERBATION (HCC): Primary | ICD-10-CM

## 2021-08-23 LAB
A-A DO2: 20 MMHG (ref 0–300)
ALBUMIN SERPL-MCNC: 3.26 G/DL (ref 3.5–5.2)
ALBUMIN/GLOB SERPL: 1.4 G/DL
ALP SERPL-CCNC: 205 U/L (ref 39–117)
ALT SERPL W P-5'-P-CCNC: 74 U/L (ref 1–33)
ANION GAP SERPL CALCULATED.3IONS-SCNC: 9.8 MMOL/L (ref 5–15)
ARTERIAL PATENCY WRIST A: POSITIVE
AST SERPL-CCNC: 42 U/L (ref 1–32)
ATMOSPHERIC PRESS: 729 MMHG
BASE EXCESS BLDA CALC-SCNC: 5.7 MMOL/L (ref 0–2)
BASOPHILS # BLD AUTO: 0.02 10*3/MM3 (ref 0–0.2)
BASOPHILS NFR BLD AUTO: 0.2 % (ref 0–1.5)
BDY SITE: ABNORMAL
BILIRUB SERPL-MCNC: 0.5 MG/DL (ref 0–1.2)
BODY TEMPERATURE: 0 C
BUN SERPL-MCNC: 23 MG/DL (ref 8–23)
BUN/CREAT SERPL: 39.7 (ref 7–25)
CALCIUM SPEC-SCNC: 8.7 MG/DL (ref 8.6–10.5)
CHLORIDE SERPL-SCNC: 99 MMOL/L (ref 98–107)
CO2 BLDA-SCNC: 31.7 MMOL/L (ref 22–33)
CO2 SERPL-SCNC: 29.2 MMOL/L (ref 22–29)
COHGB MFR BLD: 0.8 % (ref 0–5)
CREAT SERPL-MCNC: 0.58 MG/DL (ref 0.57–1)
DEPRECATED RDW RBC AUTO: 47.8 FL (ref 37–54)
EOSINOPHIL # BLD AUTO: 0.17 10*3/MM3 (ref 0–0.4)
EOSINOPHIL NFR BLD AUTO: 2 % (ref 0.3–6.2)
ERYTHROCYTE [DISTWIDTH] IN BLOOD BY AUTOMATED COUNT: 13.9 % (ref 12.3–15.4)
GFR SERPL CREATININE-BSD FRML MDRD: 103 ML/MIN/1.73
GLOBULIN UR ELPH-MCNC: 2.3 GM/DL
GLUCOSE SERPL-MCNC: 80 MG/DL (ref 65–99)
HCO3 BLDA-SCNC: 30.4 MMOL/L (ref 20–26)
HCT VFR BLD AUTO: 41.7 % (ref 34–46.6)
HCT VFR BLD CALC: 42.6 % (ref 38–51)
HGB BLD-MCNC: 13.4 G/DL (ref 12–15.9)
HGB BLDA-MCNC: 13.9 G/DL (ref 13.5–17.5)
HOLD SPECIMEN: NORMAL
HOLD SPECIMEN: NORMAL
IMM GRANULOCYTES # BLD AUTO: 0.03 10*3/MM3 (ref 0–0.05)
IMM GRANULOCYTES NFR BLD AUTO: 0.3 % (ref 0–0.5)
INHALED O2 CONCENTRATION: 21 %
LYMPHOCYTES # BLD AUTO: 0.58 10*3/MM3 (ref 0.7–3.1)
LYMPHOCYTES NFR BLD AUTO: 6.8 % (ref 19.6–45.3)
Lab: ABNORMAL
MCH RBC QN AUTO: 30.4 PG (ref 26.6–33)
MCHC RBC AUTO-ENTMCNC: 32.1 G/DL (ref 31.5–35.7)
MCV RBC AUTO: 94.6 FL (ref 79–97)
METHGB BLD QL: 0.2 % (ref 0–3)
MODALITY: ABNORMAL
MONOCYTES # BLD AUTO: 0.39 10*3/MM3 (ref 0.1–0.9)
MONOCYTES NFR BLD AUTO: 4.5 % (ref 5–12)
NEUTROPHILS NFR BLD AUTO: 7.4 10*3/MM3 (ref 1.7–7)
NEUTROPHILS NFR BLD AUTO: 86.2 % (ref 42.7–76)
NOTE: ABNORMAL
NRBC BLD AUTO-RTO: 0 /100 WBC (ref 0–0.2)
NT-PROBNP SERPL-MCNC: 637.1 PG/ML (ref 0–900)
OXYHGB MFR BLDV: 94.2 % (ref 94–99)
PCO2 BLDA: 43.2 MM HG (ref 35–45)
PCO2 TEMP ADJ BLD: ABNORMAL MM[HG]
PH BLDA: 7.46 PH UNITS (ref 7.35–7.45)
PH, TEMP CORRECTED: ABNORMAL
PLATELET # BLD AUTO: 286 10*3/MM3 (ref 140–450)
PMV BLD AUTO: 9.8 FL (ref 6–12)
PO2 BLDA: 74.3 MM HG (ref 83–108)
PO2 TEMP ADJ BLD: ABNORMAL MM[HG]
POTASSIUM SERPL-SCNC: 3.4 MMOL/L (ref 3.5–5.2)
PROT SERPL-MCNC: 5.6 G/DL (ref 6–8.5)
RBC # BLD AUTO: 4.41 10*6/MM3 (ref 3.77–5.28)
SAO2 % BLDCOA: 95.2 % (ref 94–99)
SODIUM SERPL-SCNC: 138 MMOL/L (ref 136–145)
TROPONIN T SERPL-MCNC: <0.01 NG/ML (ref 0–0.03)
VENTILATOR MODE: ABNORMAL
WBC # BLD AUTO: 8.59 10*3/MM3 (ref 3.4–10.8)
WHOLE BLOOD HOLD SPECIMEN: NORMAL
WHOLE BLOOD HOLD SPECIMEN: NORMAL

## 2021-08-23 PROCEDURE — 80053 COMPREHEN METABOLIC PANEL: CPT | Performed by: STUDENT IN AN ORGANIZED HEALTH CARE EDUCATION/TRAINING PROGRAM

## 2021-08-23 PROCEDURE — 83880 ASSAY OF NATRIURETIC PEPTIDE: CPT | Performed by: STUDENT IN AN ORGANIZED HEALTH CARE EDUCATION/TRAINING PROGRAM

## 2021-08-23 PROCEDURE — 82375 ASSAY CARBOXYHB QUANT: CPT

## 2021-08-23 PROCEDURE — 82805 BLOOD GASES W/O2 SATURATION: CPT

## 2021-08-23 PROCEDURE — 71045 X-RAY EXAM CHEST 1 VIEW: CPT

## 2021-08-23 PROCEDURE — 85025 COMPLETE CBC W/AUTO DIFF WBC: CPT | Performed by: STUDENT IN AN ORGANIZED HEALTH CARE EDUCATION/TRAINING PROGRAM

## 2021-08-23 PROCEDURE — 93010 ELECTROCARDIOGRAM REPORT: CPT | Performed by: INTERNAL MEDICINE

## 2021-08-23 PROCEDURE — 25010000002 METHYLPREDNISOLONE PER 125 MG: Performed by: STUDENT IN AN ORGANIZED HEALTH CARE EDUCATION/TRAINING PROGRAM

## 2021-08-23 PROCEDURE — 83050 HGB METHEMOGLOBIN QUAN: CPT

## 2021-08-23 PROCEDURE — 0202U NFCT DS 22 TRGT SARS-COV-2: CPT | Performed by: STUDENT IN AN ORGANIZED HEALTH CARE EDUCATION/TRAINING PROGRAM

## 2021-08-23 PROCEDURE — 96374 THER/PROPH/DIAG INJ IV PUSH: CPT

## 2021-08-23 PROCEDURE — 99283 EMERGENCY DEPT VISIT LOW MDM: CPT

## 2021-08-23 PROCEDURE — 84484 ASSAY OF TROPONIN QUANT: CPT | Performed by: STUDENT IN AN ORGANIZED HEALTH CARE EDUCATION/TRAINING PROGRAM

## 2021-08-23 PROCEDURE — 93005 ELECTROCARDIOGRAM TRACING: CPT | Performed by: STUDENT IN AN ORGANIZED HEALTH CARE EDUCATION/TRAINING PROGRAM

## 2021-08-23 PROCEDURE — 36600 WITHDRAWAL OF ARTERIAL BLOOD: CPT

## 2021-08-23 RX ORDER — SODIUM CHLORIDE 0.9 % (FLUSH) 0.9 %
10 SYRINGE (ML) INJECTION AS NEEDED
Status: DISCONTINUED | OUTPATIENT
Start: 2021-08-23 | End: 2021-08-24 | Stop reason: HOSPADM

## 2021-08-23 RX ORDER — METHYLPREDNISOLONE SODIUM SUCCINATE 125 MG/2ML
125 INJECTION, POWDER, LYOPHILIZED, FOR SOLUTION INTRAMUSCULAR; INTRAVENOUS ONCE
Status: COMPLETED | OUTPATIENT
Start: 2021-08-23 | End: 2021-08-23

## 2021-08-23 RX ADMIN — METHYLPREDNISOLONE SODIUM SUCCINATE 125 MG: 125 INJECTION, POWDER, FOR SOLUTION INTRAMUSCULAR; INTRAVENOUS at 23:13

## 2021-08-24 VITALS
TEMPERATURE: 98.6 F | RESPIRATION RATE: 16 BRPM | OXYGEN SATURATION: 95 % | DIASTOLIC BLOOD PRESSURE: 64 MMHG | HEART RATE: 80 BPM | BODY MASS INDEX: 13.8 KG/M2 | HEIGHT: 62 IN | SYSTOLIC BLOOD PRESSURE: 125 MMHG | WEIGHT: 75 LBS

## 2021-08-24 LAB
B PARAPERT DNA SPEC QL NAA+PROBE: NOT DETECTED
B PERT DNA SPEC QL NAA+PROBE: NOT DETECTED
C PNEUM DNA NPH QL NAA+NON-PROBE: NOT DETECTED
FLUAV SUBTYP SPEC NAA+PROBE: NOT DETECTED
FLUBV RNA ISLT QL NAA+PROBE: NOT DETECTED
HADV DNA SPEC NAA+PROBE: NOT DETECTED
HCOV 229E RNA SPEC QL NAA+PROBE: NOT DETECTED
HCOV HKU1 RNA SPEC QL NAA+PROBE: NOT DETECTED
HCOV NL63 RNA SPEC QL NAA+PROBE: NOT DETECTED
HCOV OC43 RNA SPEC QL NAA+PROBE: NOT DETECTED
HMPV RNA NPH QL NAA+NON-PROBE: NOT DETECTED
HPIV1 RNA SPEC QL NAA+PROBE: NOT DETECTED
HPIV2 RNA SPEC QL NAA+PROBE: NOT DETECTED
HPIV3 RNA NPH QL NAA+PROBE: NOT DETECTED
HPIV4 P GENE NPH QL NAA+PROBE: NOT DETECTED
M PNEUMO IGG SER IA-ACNC: NOT DETECTED
QT INTERVAL: 370 MS
QT INTERVAL: 406 MS
QTC INTERVAL: 418 MS
QTC INTERVAL: 450 MS
RHINOVIRUS RNA SPEC NAA+PROBE: NOT DETECTED
RSV RNA NPH QL NAA+NON-PROBE: NOT DETECTED
SARS-COV-2 RNA NPH QL NAA+NON-PROBE: NOT DETECTED

## 2021-08-24 RX ORDER — PREDNISONE 50 MG/1
50 TABLET ORAL DAILY
Qty: 5 TABLET | Refills: 0 | Status: ON HOLD | OUTPATIENT
Start: 2021-08-24 | End: 2021-09-06

## 2021-08-24 RX ORDER — DOXYCYCLINE 100 MG/1
100 CAPSULE ORAL ONCE
Status: COMPLETED | OUTPATIENT
Start: 2021-08-24 | End: 2021-08-24

## 2021-08-24 RX ORDER — DOXYCYCLINE 100 MG/1
100 CAPSULE ORAL 2 TIMES DAILY
Qty: 13 CAPSULE | Refills: 0 | Status: ON HOLD | OUTPATIENT
Start: 2021-08-24 | End: 2021-09-06

## 2021-08-24 RX ADMIN — DOXYCYCLINE 100 MG: 100 CAPSULE ORAL at 02:30

## 2021-08-24 NOTE — ED NOTES
Called for ABG at this time, Provider made aware of oxygen saturation.     Cindy Wilson, ARI  08/23/21 5435

## 2021-08-24 NOTE — ED PROVIDER NOTES
Subjective   71-year-old female with a past medical history of dementia presents to the ER with her family due to concerns for increasing shortness of breath and wheezing.  Mild cough is also noted.  Patient's family notes symptoms been present just prior to presentation to the ER.  Patient is able to deny obvious signs of chest pain.  Patient's family denied no fever or chills.  No changes in bowel or urinary habits noted.  No abdominal pain noted.  Slightly decreased O2 saturation noted on arrival.  However, patient is very thin.  ABG noted a PaO2 of 72 on room air.  Blood pressure stable          Review of Systems   Respiratory: Positive for cough and shortness of breath.    All other systems reviewed and are negative.      Past Medical History:   Diagnosis Date   • Dementia        No Known Allergies    Past Surgical History:   Procedure Laterality Date   • AUGMENTATION MAMMAPLASTY Bilateral    • BREAST AUGMENTATION     •  SECTION     • EYE SURGERY Right     prostetic right eye   • GALLBLADDER SURGERY     • HYSTERECTOMY     • TONSILLECTOMY         Family History   Problem Relation Age of Onset   • Breast cancer Maternal Aunt        Social History     Socioeconomic History   • Marital status:      Spouse name: Not on file   • Number of children: Not on file   • Years of education: Not on file   • Highest education level: Not on file   Tobacco Use   • Smoking status: Never Smoker   • Smokeless tobacco: Never Used           Objective   Physical Exam  Constitutional:       General: She is not in acute distress.     Appearance: Normal appearance. She is not ill-appearing.   HENT:      Head: Normocephalic and atraumatic.      Right Ear: External ear normal.      Left Ear: External ear normal.      Nose: Nose normal.      Mouth/Throat:      Mouth: Mucous membranes are moist.   Eyes:      Extraocular Movements: Extraocular movements intact.      Pupils: Pupils are equal, round, and reactive to  light.   Cardiovascular:      Rate and Rhythm: Normal rate and regular rhythm.      Heart sounds: No murmur heard.     Pulmonary:      Effort: Pulmonary effort is normal. No respiratory distress.      Breath sounds: Examination of the right-upper field reveals decreased breath sounds. Examination of the left-upper field reveals decreased breath sounds. Examination of the right-middle field reveals decreased breath sounds. Examination of the left-middle field reveals decreased breath sounds. Examination of the right-lower field reveals decreased breath sounds. Examination of the left-lower field reveals decreased breath sounds. Decreased breath sounds present. No wheezing.   Abdominal:      General: Bowel sounds are normal.      Palpations: Abdomen is soft.      Tenderness: There is no abdominal tenderness.   Musculoskeletal:         General: No deformity or signs of injury. Normal range of motion.      Cervical back: Normal range of motion and neck supple.   Skin:     General: Skin is warm and dry.      Findings: No erythema.   Neurological:      General: No focal deficit present.      Mental Status: She is alert. Mental status is at baseline. She is disoriented.      Cranial Nerves: No cranial nerve deficit.   Psychiatric:         Mood and Affect: Mood normal.         Behavior: Behavior normal.         Thought Content: Thought content normal.         Procedures           ED Course  ED Course as of Aug 24 0145   Mon Aug 23, 2021   2342 EKG noted normal sinus rhythm.  Age-indeterminate septal infarct noted.  QRS 60 ms.  Rate 74 bpm.   ms    [SF]   Tue Aug 24, 2021   0143 CBC and CCP unremarkable.  ABG consistent with patient's COPD history.  Chest x-ray unremarkable.  Viral panel negative.  Patient received Solu-Medrol 125 mg IV x1.  Patient does not require supplemental oxygen at this time.  Patient was started on doxycycline 100 mg p.o.  Likely COPD exacerbation.  Work up and results were discussed throughly  with the patient family.  The patient will be discharged for further monitoring and management with their PCP.  Red flags, warning signs, worsening symptoms, and when to return to the ER discussed with and understood by the patient.  Patient will follow up with their PCP in a timely manner.  Patient family expressed full understanding.  Vitals stable at discharge.    [SF]      ED Course User Index  [SF] Aris Fallon DO                                           [facility]    Final diagnoses:   COPD exacerbation (CMS/[facility])       ED Disposition  ED Disposition     ED Disposition Condition Comment    Discharge Stable           Noemi Tracy, APRN  [id number] [facility] [address]  [address]  [facility] [address]  [phone]    In 2 days      [facility] Department  [address] [facility] [address] [phone]    If symptoms worsen         Medication List      New Prescriptions    doxycycline 100 MG capsule  Commonly known as: MONODOX  Take 1 capsule by mouth 2 (Two) Times a Day.     predniSONE 50 MG tablet  Commonly known as: DELTASONE  Take 1 tablet by mouth Daily.           Where to Get Your Medications      These medications were sent to [facility] - [place], [place] - [address] [facility] - [phone]  - [phone] FX  [address] [facility] [address]    Phone: [phone]   · doxycycline 100 MG capsule  · predniSONE 50 MG tablet          Aris Fallon DO  08/24/21 0145

## 2021-09-05 ENCOUNTER — APPOINTMENT (OUTPATIENT)
Dept: CT IMAGING | Facility: HOSPITAL | Age: 71
End: 2021-09-05

## 2021-09-05 ENCOUNTER — APPOINTMENT (OUTPATIENT)
Dept: GENERAL RADIOLOGY | Facility: HOSPITAL | Age: 71
End: 2021-09-05

## 2021-09-05 ENCOUNTER — HOSPITAL ENCOUNTER (INPATIENT)
Facility: HOSPITAL | Age: 71
LOS: 4 days | Discharge: NURSING, REHAB OR LONG-TERM CARE | End: 2021-09-10
Attending: FAMILY MEDICINE | Admitting: STUDENT IN AN ORGANIZED HEALTH CARE EDUCATION/TRAINING PROGRAM

## 2021-09-05 DIAGNOSIS — J18.9 PNEUMONIA DUE TO INFECTIOUS ORGANISM, UNSPECIFIED LATERALITY, UNSPECIFIED PART OF LUNG: Primary | ICD-10-CM

## 2021-09-05 DIAGNOSIS — R55 SYNCOPE, UNSPECIFIED SYNCOPE TYPE: ICD-10-CM

## 2021-09-05 LAB
ALBUMIN SERPL-MCNC: 3.07 G/DL (ref 3.5–5.2)
ALBUMIN/GLOB SERPL: 1.2 G/DL
ALP SERPL-CCNC: 183 U/L (ref 39–117)
ALT SERPL W P-5'-P-CCNC: 65 U/L (ref 1–33)
ANION GAP SERPL CALCULATED.3IONS-SCNC: 11.1 MMOL/L (ref 5–15)
AST SERPL-CCNC: 59 U/L (ref 1–32)
BASOPHILS # BLD AUTO: 0.05 10*3/MM3 (ref 0–0.2)
BASOPHILS NFR BLD AUTO: 0.5 % (ref 0–1.5)
BILIRUB SERPL-MCNC: 0.4 MG/DL (ref 0–1.2)
BILIRUB UR QL STRIP: NEGATIVE
BUN SERPL-MCNC: 14 MG/DL (ref 8–23)
BUN/CREAT SERPL: 23.7 (ref 7–25)
CALCIUM SPEC-SCNC: 8.3 MG/DL (ref 8.6–10.5)
CHLORIDE SERPL-SCNC: 101 MMOL/L (ref 98–107)
CLARITY UR: CLEAR
CO2 SERPL-SCNC: 23.9 MMOL/L (ref 22–29)
COLOR UR: YELLOW
CREAT SERPL-MCNC: 0.59 MG/DL (ref 0.57–1)
DEPRECATED RDW RBC AUTO: 50.8 FL (ref 37–54)
EOSINOPHIL # BLD AUTO: 0.01 10*3/MM3 (ref 0–0.4)
EOSINOPHIL NFR BLD AUTO: 0.1 % (ref 0.3–6.2)
ERYTHROCYTE [DISTWIDTH] IN BLOOD BY AUTOMATED COUNT: 14.6 % (ref 12.3–15.4)
FLUAV RNA RESP QL NAA+PROBE: NOT DETECTED
FLUBV RNA RESP QL NAA+PROBE: NOT DETECTED
GFR SERPL CREATININE-BSD FRML MDRD: 101 ML/MIN/1.73
GLOBULIN UR ELPH-MCNC: 2.6 GM/DL
GLUCOSE SERPL-MCNC: 84 MG/DL (ref 65–99)
GLUCOSE UR STRIP-MCNC: NEGATIVE MG/DL
HCT VFR BLD AUTO: 41.2 % (ref 34–46.6)
HGB BLD-MCNC: 13 G/DL (ref 12–15.9)
HGB UR QL STRIP.AUTO: NEGATIVE
HOLD SPECIMEN: NORMAL
HOLD SPECIMEN: NORMAL
IMM GRANULOCYTES # BLD AUTO: 0.11 10*3/MM3 (ref 0–0.05)
IMM GRANULOCYTES NFR BLD AUTO: 1 % (ref 0–0.5)
INR PPP: 0.97 (ref 0.9–1.1)
KETONES UR QL STRIP: NEGATIVE
LEUKOCYTE ESTERASE UR QL STRIP.AUTO: NEGATIVE
LYMPHOCYTES # BLD AUTO: 0.6 10*3/MM3 (ref 0.7–3.1)
LYMPHOCYTES NFR BLD AUTO: 5.4 % (ref 19.6–45.3)
MCH RBC QN AUTO: 30.4 PG (ref 26.6–33)
MCHC RBC AUTO-ENTMCNC: 31.6 G/DL (ref 31.5–35.7)
MCV RBC AUTO: 96.5 FL (ref 79–97)
MONOCYTES # BLD AUTO: 0.44 10*3/MM3 (ref 0.1–0.9)
MONOCYTES NFR BLD AUTO: 4 % (ref 5–12)
NEUTROPHILS NFR BLD AUTO: 89 % (ref 42.7–76)
NEUTROPHILS NFR BLD AUTO: 9.85 10*3/MM3 (ref 1.7–7)
NITRITE UR QL STRIP: NEGATIVE
NRBC BLD AUTO-RTO: 0 /100 WBC (ref 0–0.2)
NT-PROBNP SERPL-MCNC: 892.8 PG/ML (ref 0–900)
PH UR STRIP.AUTO: 6.5 [PH] (ref 5–8)
PLATELET # BLD AUTO: 222 10*3/MM3 (ref 140–450)
PMV BLD AUTO: 10.3 FL (ref 6–12)
POTASSIUM SERPL-SCNC: 3.4 MMOL/L (ref 3.5–5.2)
PROT SERPL-MCNC: 5.7 G/DL (ref 6–8.5)
PROT UR QL STRIP: NEGATIVE
PROTHROMBIN TIME: 13.3 SECONDS (ref 12.8–14.5)
RBC # BLD AUTO: 4.27 10*6/MM3 (ref 3.77–5.28)
SARS-COV-2 RNA RESP QL NAA+PROBE: NOT DETECTED
SODIUM SERPL-SCNC: 136 MMOL/L (ref 136–145)
SP GR UR STRIP: 1.01 (ref 1–1.03)
TROPONIN T SERPL-MCNC: <0.01 NG/ML (ref 0–0.03)
UROBILINOGEN UR QL STRIP: NORMAL
WBC # BLD AUTO: 11.06 10*3/MM3 (ref 3.4–10.8)
WHOLE BLOOD HOLD SPECIMEN: NORMAL
WHOLE BLOOD HOLD SPECIMEN: NORMAL

## 2021-09-05 PROCEDURE — 81003 URINALYSIS AUTO W/O SCOPE: CPT | Performed by: FAMILY MEDICINE

## 2021-09-05 PROCEDURE — 83880 ASSAY OF NATRIURETIC PEPTIDE: CPT | Performed by: FAMILY MEDICINE

## 2021-09-05 PROCEDURE — 87636 SARSCOV2 & INF A&B AMP PRB: CPT | Performed by: FAMILY MEDICINE

## 2021-09-05 PROCEDURE — 84443 ASSAY THYROID STIM HORMONE: CPT | Performed by: NURSE PRACTITIONER

## 2021-09-05 PROCEDURE — 51798 US URINE CAPACITY MEASURE: CPT

## 2021-09-05 PROCEDURE — 71045 X-RAY EXAM CHEST 1 VIEW: CPT

## 2021-09-05 PROCEDURE — 70450 CT HEAD/BRAIN W/O DYE: CPT

## 2021-09-05 PROCEDURE — 85610 PROTHROMBIN TIME: CPT | Performed by: FAMILY MEDICINE

## 2021-09-05 PROCEDURE — 99284 EMERGENCY DEPT VISIT MOD MDM: CPT

## 2021-09-05 PROCEDURE — 80074 ACUTE HEPATITIS PANEL: CPT | Performed by: NURSE PRACTITIONER

## 2021-09-05 PROCEDURE — 82077 ASSAY SPEC XCP UR&BREATH IA: CPT | Performed by: NURSE PRACTITIONER

## 2021-09-05 PROCEDURE — 93005 ELECTROCARDIOGRAM TRACING: CPT | Performed by: FAMILY MEDICINE

## 2021-09-05 PROCEDURE — 80053 COMPREHEN METABOLIC PANEL: CPT | Performed by: FAMILY MEDICINE

## 2021-09-05 PROCEDURE — 84484 ASSAY OF TROPONIN QUANT: CPT | Performed by: FAMILY MEDICINE

## 2021-09-05 PROCEDURE — 80143 DRUG ASSAY ACETAMINOPHEN: CPT | Performed by: NURSE PRACTITIONER

## 2021-09-05 PROCEDURE — 74176 CT ABD & PELVIS W/O CONTRAST: CPT

## 2021-09-05 PROCEDURE — 71250 CT THORAX DX C-: CPT

## 2021-09-05 PROCEDURE — 85025 COMPLETE CBC W/AUTO DIFF WBC: CPT | Performed by: FAMILY MEDICINE

## 2021-09-05 RX ORDER — SODIUM CHLORIDE 0.9 % (FLUSH) 0.9 %
10 SYRINGE (ML) INJECTION AS NEEDED
Status: DISCONTINUED | OUTPATIENT
Start: 2021-09-05 | End: 2021-09-10 | Stop reason: HOSPADM

## 2021-09-05 RX ADMIN — SODIUM CHLORIDE 1000 ML: 9 INJECTION, SOLUTION INTRAVENOUS at 21:35

## 2021-09-06 PROBLEM — J18.9 PNEUMONIA DUE TO INFECTIOUS ORGANISM: Status: ACTIVE | Noted: 2021-09-06

## 2021-09-06 LAB
ALBUMIN SERPL-MCNC: 2.71 G/DL (ref 3.5–5.2)
ALBUMIN/GLOB SERPL: 0.9 G/DL
ALP SERPL-CCNC: 191 U/L (ref 39–117)
ALT SERPL W P-5'-P-CCNC: 75 U/L (ref 1–33)
ANION GAP SERPL CALCULATED.3IONS-SCNC: 10.8 MMOL/L (ref 5–15)
APAP SERPL-MCNC: <5 MCG/ML (ref 0–30)
AST SERPL-CCNC: 85 U/L (ref 1–32)
BASOPHILS # BLD AUTO: 0.03 10*3/MM3 (ref 0–0.2)
BASOPHILS NFR BLD AUTO: 0.3 % (ref 0–1.5)
BILIRUB SERPL-MCNC: 0.5 MG/DL (ref 0–1.2)
BUN SERPL-MCNC: 9 MG/DL (ref 8–23)
BUN/CREAT SERPL: 23.1 (ref 7–25)
CALCIUM SPEC-SCNC: 8.4 MG/DL (ref 8.6–10.5)
CHLORIDE SERPL-SCNC: 103 MMOL/L (ref 98–107)
CO2 SERPL-SCNC: 20.2 MMOL/L (ref 22–29)
CREAT SERPL-MCNC: 0.39 MG/DL (ref 0.57–1)
CRP SERPL-MCNC: 0.45 MG/DL (ref 0–0.5)
D-LACTATE SERPL-SCNC: 1.9 MMOL/L (ref 0.5–2)
DEPRECATED RDW RBC AUTO: 49 FL (ref 37–54)
EOSINOPHIL # BLD AUTO: 0.01 10*3/MM3 (ref 0–0.4)
EOSINOPHIL NFR BLD AUTO: 0.1 % (ref 0.3–6.2)
ERYTHROCYTE [DISTWIDTH] IN BLOOD BY AUTOMATED COUNT: 14.4 % (ref 12.3–15.4)
ETHANOL BLD-MCNC: <10 MG/DL (ref 0–10)
ETHANOL UR QL: <0.01 %
GFR SERPL CREATININE-BSD FRML MDRD: >150 ML/MIN/1.73
GLOBULIN UR ELPH-MCNC: 3.1 GM/DL
GLUCOSE BLDC GLUCOMTR-MCNC: 139 MG/DL (ref 70–130)
GLUCOSE BLDC GLUCOMTR-MCNC: 50 MG/DL (ref 70–130)
GLUCOSE BLDC GLUCOMTR-MCNC: 51 MG/DL (ref 70–130)
GLUCOSE BLDC GLUCOMTR-MCNC: 79 MG/DL (ref 70–130)
GLUCOSE SERPL-MCNC: 69 MG/DL (ref 65–99)
HAV IGM SERPL QL IA: NORMAL
HBV CORE IGM SERPL QL IA: NORMAL
HBV SURFACE AG SERPL QL IA: NORMAL
HCT VFR BLD AUTO: 40.5 % (ref 34–46.6)
HCV AB SER DONR QL: NORMAL
HGB BLD-MCNC: 13.1 G/DL (ref 12–15.9)
IMM GRANULOCYTES # BLD AUTO: 0.04 10*3/MM3 (ref 0–0.05)
IMM GRANULOCYTES NFR BLD AUTO: 0.4 % (ref 0–0.5)
L PNEUMO1 AG UR QL IA: NEGATIVE
LYMPHOCYTES # BLD AUTO: 0.67 10*3/MM3 (ref 0.7–3.1)
LYMPHOCYTES NFR BLD AUTO: 6.5 % (ref 19.6–45.3)
MAGNESIUM SERPL-MCNC: 2.1 MG/DL (ref 1.6–2.4)
MCH RBC QN AUTO: 30.3 PG (ref 26.6–33)
MCHC RBC AUTO-ENTMCNC: 32.3 G/DL (ref 31.5–35.7)
MCV RBC AUTO: 93.8 FL (ref 79–97)
MONOCYTES # BLD AUTO: 0.47 10*3/MM3 (ref 0.1–0.9)
MONOCYTES NFR BLD AUTO: 4.6 % (ref 5–12)
NEUTROPHILS NFR BLD AUTO: 88.1 % (ref 42.7–76)
NEUTROPHILS NFR BLD AUTO: 9.1 10*3/MM3 (ref 1.7–7)
NRBC BLD AUTO-RTO: 0 /100 WBC (ref 0–0.2)
PLATELET # BLD AUTO: 278 10*3/MM3 (ref 140–450)
PMV BLD AUTO: 9.6 FL (ref 6–12)
POTASSIUM SERPL-SCNC: 4.2 MMOL/L (ref 3.5–5.2)
PROT SERPL-MCNC: 5.8 G/DL (ref 6–8.5)
QT INTERVAL: 422 MS
QTC INTERVAL: 448 MS
RBC # BLD AUTO: 4.32 10*6/MM3 (ref 3.77–5.28)
SODIUM SERPL-SCNC: 134 MMOL/L (ref 136–145)
TROPONIN T SERPL-MCNC: <0.01 NG/ML (ref 0–0.03)
TSH SERPL DL<=0.05 MIU/L-ACNC: 5.35 UIU/ML (ref 0.27–4.2)
WBC # BLD AUTO: 10.32 10*3/MM3 (ref 3.4–10.8)

## 2021-09-06 PROCEDURE — 84484 ASSAY OF TROPONIN QUANT: CPT | Performed by: NURSE PRACTITIONER

## 2021-09-06 PROCEDURE — 83605 ASSAY OF LACTIC ACID: CPT | Performed by: FAMILY MEDICINE

## 2021-09-06 PROCEDURE — 87899 AGENT NOS ASSAY W/OPTIC: CPT | Performed by: NURSE PRACTITIONER

## 2021-09-06 PROCEDURE — 83735 ASSAY OF MAGNESIUM: CPT | Performed by: NURSE PRACTITIONER

## 2021-09-06 PROCEDURE — 94799 UNLISTED PULMONARY SVC/PX: CPT

## 2021-09-06 PROCEDURE — 85025 COMPLETE CBC W/AUTO DIFF WBC: CPT | Performed by: NURSE PRACTITIONER

## 2021-09-06 PROCEDURE — 25010000002 ENOXAPARIN PER 10 MG: Performed by: STUDENT IN AN ORGANIZED HEALTH CARE EDUCATION/TRAINING PROGRAM

## 2021-09-06 PROCEDURE — 86140 C-REACTIVE PROTEIN: CPT | Performed by: NURSE PRACTITIONER

## 2021-09-06 PROCEDURE — 82962 GLUCOSE BLOOD TEST: CPT

## 2021-09-06 PROCEDURE — 87040 BLOOD CULTURE FOR BACTERIA: CPT | Performed by: FAMILY MEDICINE

## 2021-09-06 PROCEDURE — 25010000002 CEFTRIAXONE PER 250 MG: Performed by: FAMILY MEDICINE

## 2021-09-06 PROCEDURE — 80053 COMPREHEN METABOLIC PANEL: CPT | Performed by: NURSE PRACTITIONER

## 2021-09-06 PROCEDURE — 99222 1ST HOSP IP/OBS MODERATE 55: CPT | Performed by: NURSE PRACTITIONER

## 2021-09-06 PROCEDURE — 94640 AIRWAY INHALATION TREATMENT: CPT

## 2021-09-06 RX ORDER — SODIUM CHLORIDE 0.9 % (FLUSH) 0.9 %
10 SYRINGE (ML) INJECTION EVERY 12 HOURS SCHEDULED
Status: DISCONTINUED | OUTPATIENT
Start: 2021-09-06 | End: 2021-09-10 | Stop reason: HOSPADM

## 2021-09-06 RX ORDER — AMLODIPINE BESYLATE 5 MG/1
5 TABLET ORAL DAILY
Status: CANCELLED | OUTPATIENT
Start: 2021-09-06

## 2021-09-06 RX ORDER — POTASSIUM CHLORIDE 20 MEQ/1
40 TABLET, EXTENDED RELEASE ORAL ONCE
Status: COMPLETED | OUTPATIENT
Start: 2021-09-06 | End: 2021-09-06

## 2021-09-06 RX ORDER — NICOTINE POLACRILEX 4 MG
15 LOZENGE BUCCAL
Status: DISCONTINUED | OUTPATIENT
Start: 2021-09-06 | End: 2021-09-10 | Stop reason: HOSPADM

## 2021-09-06 RX ORDER — AMLODIPINE BESYLATE 5 MG/1
5 TABLET ORAL DAILY
COMMUNITY
End: 2021-09-10 | Stop reason: HOSPADM

## 2021-09-06 RX ORDER — DEXTROSE MONOHYDRATE 25 G/50ML
25 INJECTION, SOLUTION INTRAVENOUS
Status: DISCONTINUED | OUTPATIENT
Start: 2021-09-06 | End: 2021-09-10 | Stop reason: HOSPADM

## 2021-09-06 RX ORDER — IPRATROPIUM BROMIDE AND ALBUTEROL SULFATE 2.5; .5 MG/3ML; MG/3ML
3 SOLUTION RESPIRATORY (INHALATION) ONCE
Status: COMPLETED | OUTPATIENT
Start: 2021-09-06 | End: 2021-09-06

## 2021-09-06 RX ORDER — SODIUM CHLORIDE 0.9 % (FLUSH) 0.9 %
10 SYRINGE (ML) INJECTION AS NEEDED
Status: DISCONTINUED | OUTPATIENT
Start: 2021-09-06 | End: 2021-09-10 | Stop reason: HOSPADM

## 2021-09-06 RX ORDER — POLYETHYLENE GLYCOL 3350 17 G/17G
17 POWDER, FOR SOLUTION ORAL DAILY
Status: DISCONTINUED | OUTPATIENT
Start: 2021-09-06 | End: 2021-09-10 | Stop reason: HOSPADM

## 2021-09-06 RX ADMIN — POLYETHYLENE GLYCOL (3350) 17 G: 17 POWDER, FOR SOLUTION ORAL at 18:18

## 2021-09-06 RX ADMIN — CEFTRIAXONE 1 G: 1 INJECTION, POWDER, FOR SOLUTION INTRAMUSCULAR; INTRAVENOUS at 01:37

## 2021-09-06 RX ADMIN — DOXYCYCLINE 100 MG: 100 INJECTION, POWDER, LYOPHILIZED, FOR SOLUTION INTRAVENOUS at 01:37

## 2021-09-06 RX ADMIN — ENOXAPARIN SODIUM 30 MG: 30 INJECTION SUBCUTANEOUS at 18:17

## 2021-09-06 RX ADMIN — IPRATROPIUM BROMIDE AND ALBUTEROL SULFATE 3 ML: .5; 3 SOLUTION RESPIRATORY (INHALATION) at 08:21

## 2021-09-06 RX ADMIN — SODIUM CHLORIDE, PRESERVATIVE FREE 10 ML: 5 INJECTION INTRAVENOUS at 19:59

## 2021-09-06 RX ADMIN — DEXTROSE MONOHYDRATE 25 G: 500 INJECTION PARENTERAL at 23:31

## 2021-09-06 RX ADMIN — DOXYCYCLINE 100 MG: 100 INJECTION, POWDER, LYOPHILIZED, FOR SOLUTION INTRAVENOUS at 23:34

## 2021-09-06 RX ADMIN — METRONIDAZOLE 500 MG: 500 INJECTION, SOLUTION INTRAVENOUS at 18:18

## 2021-09-06 RX ADMIN — DEXTROSE MONOHYDRATE 25 G: 500 INJECTION PARENTERAL at 17:01

## 2021-09-06 RX ADMIN — CEFTRIAXONE 1 G: 1 INJECTION, POWDER, FOR SOLUTION INTRAMUSCULAR; INTRAVENOUS at 23:02

## 2021-09-06 RX ADMIN — POTASSIUM CHLORIDE 40 MEQ: 20 TABLET, EXTENDED RELEASE ORAL at 08:08

## 2021-09-06 NOTE — ED PROVIDER NOTES
Subjective   71-year-old female with history of dementia presents the emergency room with same episode.  Patient was seen on  emergency room for COPD exacerbation.  EMS patient was noted to have been called out for unresponsive.  Patient was reportedly on the toilet tonight when she subsequently became unresponsive.  Upon arrival patient was noted to be awake again.  Further history is unable to be obtained as patient will answer just yes to questioning.  She is unable to give full history given her dementia.      Syncope  Episode history:  Single  Most recent episode:  Today  Progression:  Resolved  Chronicity:  New  Context: bowel movement    Relieved by:  Nothing  Worsened by:  Nothing  Ineffective treatments:  None tried      Review of Systems   Unable to perform ROS: Dementia   Cardiovascular: Positive for syncope.   All other systems reviewed and are negative.      Past Medical History:   Diagnosis Date   • Dementia        No Known Allergies    Past Surgical History:   Procedure Laterality Date   • AUGMENTATION MAMMAPLASTY Bilateral    • BREAST AUGMENTATION     •  SECTION     • EYE SURGERY Right     prostetic right eye   • GALLBLADDER SURGERY     • HYSTERECTOMY     • TONSILLECTOMY         Family History   Problem Relation Age of Onset   • Breast cancer Maternal Aunt        Social History     Socioeconomic History   • Marital status:      Spouse name: Not on file   • Number of children: Not on file   • Years of education: Not on file   • Highest education level: Not on file   Tobacco Use   • Smoking status: Never Smoker   • Smokeless tobacco: Never Used           Objective   Physical Exam  Constitutional:       Comments: Patient is awake.  Chronically ill-appearing.  Patient is frail.   HENT:      Head: Normocephalic and atraumatic.      Comments: Moist mucous membranes.  Neck:      Comments: No vertebral tenderness.  Trachea midline.  Cardiovascular:      Rate and Rhythm:  Bradycardia present.      Heart sounds: No murmur heard.        Comments: 2+ radial pulse bilateral.  Pulmonary:      Comments: Barrel chested.  No rhonchi's rales or wheezes.  Abdominal:      General: Bowel sounds are normal.      Palpations: Abdomen is soft.      Comments: Scaphoid abdomen.  Nontender normal active bowel sounds   Musculoskeletal:      Comments: Atrophy of all 4 extremities.   Skin:     General: Skin is warm and dry.      Capillary Refill: Capillary refill takes less than 2 seconds.   Neurological:      Comments: Patient disoriented time place situation.   Psychiatric:         Mood and Affect: Mood normal.         Procedures           ED Course  ED Course as of Sep 06 1431   Sun Sep 05, 2021   2141 Per family patient was noted to have been unresponsive upon them checking on her a states that patient came back to and then subsequently had a bowel and urinary incontinence episode of went back out.  Patient subsequently regained consciousness.  No seizure activity was appreciated per family.    [BB]   2156 Patient Covid flu negative.    [BB]   2216 White blood cell count 11.06.  Patient's heart rate currently 69 oxygen 95% on room air blood pressure 104 70.    [BB]   2235 BNP unremarkable    [BB]   2245 Patient noted to have elevated LFTs however have reviewed LFTs from 3 days prior and were essentially unchanged.  Will obtain CT imaging chest as well as abdomen pelvis    [BB]   Mon Sep 06, 2021   0059 Have initiated IV Rocephin as well as doxycycline for pneumonia based on patient's chest x-ray.    [BB]   0059 CT Abdomen Pelvis Without Contrast    Result Date: 9/6/2021  1. Markedly technically degraded exam as above. 2. Cachexia with anasarca and potentially some ascites. 3. Distended urinary bladder. Additionally, there is potential mild bilateral hydronephrosis with no obstructing lesion identified. Patient may benefit from Puri placement. 4. Gas-filled small bowel loops suggesting a generalized  ileus rather than a bowel obstruction. 5. No definite renal or ureteral stones. 6. Cholecystectomy and hysterectomy. Signer Name: Michel Mata MD  Signed: 9/6/2021 12:18 AM  Workstation Name: [facility]  Radiology [facility]    CT Head Without Contrast    Result Date: 9/5/2021  No acute intracranial abnormality. Signer Name: Julisa Collado MD  Signed: 9/5/2021 11:50 PM  Workstation Name: [id number]  Radiology [facility]    CT Chest Without Contrast Diagnostic    Result Date: 9/6/2021  1. COPD with mild bilateral tree-in-bud-type pneumonia as above. 2. Atherosclerotic disease and coronary artery disease. 3. Cachexia with mild generalized anasarca. Signer Name: Michel Mata MD  Signed: 9/6/2021 12:11 AM  Workstation Name: [facility]    XR Chest 1 View    Result Date: 9/5/2021  No acute cardiopulmonary findings. Signer Name: Michel Mata MD  Signed: 9/5/2021 10:29 PM  Workstation Name: [facility]        [BB]   0122 Given Rocephin and doxycycline.  Have discussed admission to hospital or there are no beds are currently available already patient daughter prefers for patient not to be transferred.    [BB]   0122 We will continue to monitor.    [BB]   0223 Have discussed case with Dr. Miranda who has assumed care    [BB]      ED Course User Index  [BB] Cornell Berman MD                                           MDM  Number of Diagnoses or Management Options     Amount and/or Complexity of Data Reviewed  Decide to obtain previous medical records or to obtain history from someone other than the patient: yes    Risk of Complications, Morbidity, and/or Mortality  Presenting problems: moderate  Diagnostic procedures: moderate  Management options: moderate        Final diagnoses:   Pneumonia due to infectious organism, unspecified laterality, unspecified part of lung   Syncope, unspecified syncope type       ED  Disposition  ED Disposition     ED Disposition Condition Comment    Decision to Admit            No follow-up provider specified.       Medication List      No changes were made to your prescriptions during this visit.          Johnny Mcnair,   09/06/21 7920

## 2021-09-06 NOTE — H&P
[facility] Medicine Services  History & Physical          Patient Identification:  Name:  Radha Bacon  Age:  71 y.o.  Sex:  female  :  1950  MRN:  [id number]   Admit Date: 2021   Visit Number:  [id number]  Primary Care Physician:  Noemi Tracy APRN    I have seen the patient in conjunction with STEPHANIE Medrano and I agree with the following statements:     Subjective     Chief complaint: low bp/near syncope episode     History of presenting illness:    Mrs. Bacon is a 71 year old male patient who presented to [facility] ED on 2021. Due to her  Underlying dementia she is unable to give any HPI/ROS or medical history, I called her daughter, Vangie who is her POA and who she lives with. Her daughter states she was with her and  had been outside smoking, she came back in and heard bump on the wall, Mrs. Bacon wasn't in her recliner where she usually sits during the day, she saw her leaned against the wall at the bathroom, she states she dropped her head she thought she had a stroke, she went to grab her and she went limp, her daughter assisted her to the floor, she had low blood pressure when EMS they arrived. She  have episode of incontinence. She has not been ill with anything recently. She denies any recent diarrhea or vomiting, no recent med changes.    Her treatment in the ER included Rocephin 1 g IV x1, doxycycline 100 mg IV x1, DuoNeb x1, potassium 40 mEq p.o. x1, 1 L normal saline.Her v/s in the ED were temp 97.1, HR 58-79, RR 18-20, BP 92//79.     Her past medical history is significant for dementia (was dx by Dr. Newman), strokes in the past (was told this by dr. 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 She is a former smoker she quit around 3 years ago,  No history of drug or alcohol abuse. She does have a daughter Vangie castaneda, who is her POA and a son.   in , she has lived with her daughter since, worsening dementia since. She does have issues with aspiration, she needed her esophageus stretched several years ago and wouldn't do it. She had her second dose of Pfizer on 2021. She is a DNR/DNI as discussed with her daughter via phone.       ---------------------------------------------------------------------------------------------------------------------   Review of Systems   Reason unable to perform ROS: unable to obtain from patient due to mental status       ---------------------------------------------------------------------------------------------------------------------   Past Medical History:   Diagnosis Date   • Dementia      Past Surgical History:   Procedure Laterality Date   • AUGMENTATION MAMMAPLASTY Bilateral    • BREAST AUGMENTATION     •  SECTION     • EYE SURGERY Right     prostetic right eye   • GALLBLADDER SURGERY     • HYSTERECTOMY     • TONSILLECTOMY       Family History   Problem Relation Age of Onset   • Breast cancer Maternal Aunt      Social History     Socioeconomic History   • Marital status:      Spouse name: Not on file   • Number of children: Not on file   • Years of education: Not on file   • Highest education level: Not on file   Tobacco Use   • Smoking status: Never Smoker   • Smokeless tobacco: Never Used     ---------------------------------------------------------------------------------------------------------------------   Allergies:  Patient has no known allergies.  ---------------------------------------------------------------------------------------------------------------------     Medications below are reported home medications pulling from within the system; at this time, these medications have not been reconciled unless otherwise specified and are in the verification process for further verifcation as current home medications.    Prior to Admission Medications     Prescriptions  Last Dose Informant Patient Reported? Taking?    amLODIPine (NORVASC) 10 MG tablet   Yes No    Take 10 mg by mouth Daily.    donepezil (ARICEPT) 5 MG tablet   Yes No    Take 5 mg by mouth Every Night.    doxycycline (MONODOX) 100 MG capsule   No No    Take 1 capsule by mouth 2 (Two) Times a Day.    ondansetron ODT (ZOFRAN-ODT) 4 MG disintegrating tablet   No No    Take 1 tablet by mouth Every 6 (Six) Hours As Needed for Nausea or Vomiting.    predniSONE (DELTASONE) 50 MG tablet   No No    Take 1 tablet by mouth Daily.    Vortioxetine HBr (TRINTELLIX) 5 MG tablet   Yes No    Take 5 mg by mouth Daily With Breakfast.        [facility] Scheduled Meds:        ---------------------------------------------------------------------------------------------------------------------   Objective       Vital Signs:  Temp:  [97.1 °F (36.2 °C)] 97.1 °F (36.2 °C)  Heart Rate:  [58-79] 61  Resp:  [18-20] 18  BP: ()/(60-94) 125/79      09/05/21 2121   Weight: 34 kg (75 lb)     Body mass index is 11.75 kg/m².  ---------------------------------------------------------------------------------------------------------------------       Physical Exam    Physical Exam:  Constitutional:  cachetic thin female in on distress  HENT:  Head: Normocephalic and atraumatic.  Mouth:  Moist mucous membranes.    Eyes:  Right eye has been surgical removed, eye lid is matted shut (nurse to clean eye)  Neck:  Neck supple.  No JVD present.    Cardiovascular:  Normal rate, regular rhythm.  with no murmur.  Pulmonary/Chest:  No respiratory distress, no wheezes, diminished   Abdominal:  Soft.  Bowel sounds are present.  No distension and no tenderness.   Musculoskeletal:  No edema, no tenderness, and no deformity.  Atrophied leg muscles  Neurological: awake and alert, does not answer any questions, continuously mumbles random words, her daughter reports this is her normal  Skin:  Skin is warm and dry.  No rash noted.  No pallor.   Psychiatric:  Normal mood  and affect.  Behavior is normal.  Judgment and thought content normal.   Peripheral vascular:  No edema and strong pulses on all 4 extremities.  ---------------------------------------------------------------------------------------------------------------------  EKG:          ---------------------------------------------------------------------------------------------------------------------   Results from last 7 days   Lab Units 09/06/21  0138 09/05/21  2311 09/05/21  2203   LACTATE mmol/L 1.9  --   --    WBC 10*3/mm3  --   --  11.06*   HEMOGLOBIN g/dL  --   --  13.0   HEMATOCRIT %  --   --  41.2   MCV fL  --   --  96.5   MCHC g/dL  --   --  31.6   PLATELETS 10*3/mm3  --   --  222   INR   --  0.97  --          Results from last 7 days   Lab Units 09/05/21  2203   SODIUM mmol/L 136   POTASSIUM mmol/L 3.4*   CHLORIDE mmol/L 101   CO2 mmol/L 23.9   BUN mg/dL 14   CREATININE mg/dL 0.59   EGFR IF NONAFRICN AM mL/min/1.73 101   CALCIUM mg/dL 8.3*   GLUCOSE mg/dL 84   ALBUMIN g/dL 3.07*   BILIRUBIN mg/dL 0.4   ALK PHOS U/L 183*   AST (SGOT) U/L 59*   ALT (SGPT) U/L 65*   Estimated Creatinine Clearance: 35.6 mL/min (by C-G formula based on SCr of 0.59 mg/dL).  No results found for: AMMONIA  Results from last 7 days   Lab Units 09/05/21 2203   TROPONIN T ng/mL <0.010     Results from last 7 days   Lab Units 09/05/21  2203   PROBNP pg/mL 892.8     No results found for: HGBA1C  Lab Results   Component Value Date    TSH 1.650 06/10/2019     No results found for: PREGTESTUR, PREGSERUM, HCG, HCGQUANT  Pain Management Panel    There is no flowsheet data to display.       No results found for: BLOODCX  No results found for: URINECX  No results found for: WOUNDCX  No results found for: STOOLCX      ---------------------------------------------------------------------------------------------------------------------  Imaging Results (Last 7 Days)     Procedure Component Value Units Date/Time    CT Abdomen Pelvis Without Contrast  [[id number]] Collected: 09/06/21 0018     Updated: 09/06/21 0020    Narrative:      CT Abdomen Pelvis WO    INDICATION:   Syncope.    TECHNIQUE:   CT of the abdomen and pelvis without IV contrast. Coronal and sagittal reconstructions were obtained.  Radiation dose reduction techniques included automated exposure control or exposure modulation based on body size. Count of known CT and cardiac nuc  med studies performed in previous 12 months: 2.     COMPARISON:   2/16/2018    FINDINGS:  Please see chest CT report dictated separately. Patient is cachectic. There is streak artifact from the patient's arms. Additionally, the exam is degraded by the lack of IV contrast, as well as the lack of intra-abdominal fat as a natural contrast  medium. There is diffuse atherosclerotic disease. There is no aortic aneurysm. Gallbladder is surgically absent. There is generalized anasarca, and I cannot exclude the possibility of some ascites. Urinary bladder is mildly distended. Uterus is  surgically absent. There is equivocal mild bilateral hydronephrosis. No definite renal or ureteral stones are seen. Solid abdominal organs are otherwise grossly normal although fairly poorly characterized. The GI tract is poorly characterized. There is  no definitive evidence of acute colitis. There are some gas-filled small bowel loops suggesting a generalized ileus. The appendix is not definitely identifiable.      Impression:        1. Markedly technically degraded exam as above.  2. Cachexia with anasarca and potentially some ascites.  3. Distended urinary bladder. Additionally, there is potential mild bilateral hydronephrosis with no obstructing lesion identified. Patient may benefit from Puir placement.  4. Gas-filled small bowel loops suggesting a generalized ileus rather than a bowel obstruction.  5. No definite renal or ureteral stones.  6. Cholecystectomy and hysterectomy.          Signer Name: Michel Mata MD   Signed: 9/6/2021 12:18  AM   Workstation Name: [facility]    Radiology [facility]    CT Chest Without Contrast Diagnostic [[id number]] Collected: 09/06/21 0011     Updated: 09/06/21 0013    Narrative:      CT Chest WO    INDICATION:   Syncopal episode. Patient unresponsive.    TECHNIQUE:   CT of the thorax without IV contrast. Coronal and sagittal reconstructions were obtained.  Radiation dose reduction techniques included automated exposure control or exposure modulation based on body size. Count of known CT and cardiac nuc med studies  performed in previous 12 months: 2.     COMPARISON:   11/14/2018    FINDINGS:  Exam is degraded by streak artifact from the patient's arms. Patient is cachectic. There does appear to be generalized anasarca. Patient has bilateral breast implants. The right side is ruptured and deflated. There is diffuse atherosclerotic disease and  coronary artery disease. No pleural or pericardial effusion is identified. There is no definite adenopathy. Lung windows show COPD. There are tree in bud infiltrates in the lower lobes, right middle lobe, and lingula suggesting an acute infection.  Imaging features are atypical or uncommonly reported for COVID-19 pneumonia. Alternative diagnoses should be considered. No dense alveolar consolidations are seen. There is some mucous plugging in right lower lobe bronchi.      Impression:        1. COPD with mild bilateral tree-in-bud-type pneumonia as above.  2. Atherosclerotic disease and coronary artery disease.  3. Cachexia with mild generalized anasarca.    Signer Name: Michel Mata MD   Signed: 9/6/2021 12:11 AM   Workstation Name: [facility]    CT Head Without Contrast [[id number]] Collected: 09/05/21 2350     Updated: 09/05/21 2352    Narrative:      CT Head WO: 9/6/2021 12:43 AM    HISTORY:   Syncope, unresponsive episode    TECHNIQUE:   Axial unenhanced head CT. Radiation dose reduction techniques included automated  exposure control or exposure modulation based on body size. Radiation audit for number of CT and nuclear cardiology exams performed in the last year: 0.      COMPARISON:   CT of the head from 10/1/2020  FINDINGS:   No intracranial hemorrhage, mass, or infarct. No hydrocephalus or extra-axial fluid collection. There is brain parenchymal volume loss. The skull base, calvarium, and extracranial soft tissues are normal apart from a right globe prosthesis.      Impression:      No acute intracranial abnormality.          Signer Name: Julisa Collado MD   Signed: 9/5/2021 11:50 PM   Workstation Name: [id number]    [facility]    XR Chest 1 View [[id number]] Collected: 09/05/21 2229     Updated: 09/05/21 2231    Narrative:      CR Chest 1 Vw    INDICATION:   COPD exacerbation. Patient became unresponsive.     COMPARISON:    8/23/2021    FINDINGS:  Portable AP view(s) of the chest.  Cardiac and mediastinal contours are normal. COPD is present. Pulmonary vascularity is normal. The lungs are clear. No pneumothorax. Deflated right breast implant is again noted.      Impression:      No acute cardiopulmonary findings.    Signer Name: Michel Mata MD   Signed: 9/5/2021 10:29 PM   Workstation Name: [email or web address]    [facility]          ---------------------------------------------------------------------------------------------------------------------  Assessment / Plan       Assessment and Plan:    SIRS 2/2 PNA (WBC 11.06)  -COVID-19 negative  -2 sets of blood cultures collected in the ER  -CT of the chest shows There are tree in bud infiltrates in the lower lobes, right middle lobe, and lingula suggesting an acute infection.  -SLP evaluation ordered  -Rocephin, Doxy and Flagyl ordered  -DuoNebs ordered  -Respiratory panel PCR, legionella and strep pneumo antigen ordered   -repeat labs in the am     Elevated Transaminases   -ALT 65  -AST 59  -Alkaline Phosphatase 183  -Acute Hepatitis  panel negative  -Alcohol level negative  -Acetaminophen level is negative   -CT of the A/P showed There is generalized anasarca, and I cannot exclude the possibility of some ascites.  -Repeat CMP In the am, monitor abdomen     Near Syncope vs Syncopal Episode   -CT of the head showed no acute intracranial abnormality   -had her Norvasc at home on 9/5  -had 1 liter of NS in the ER  -orthostatic vs ordered   -Fall precautions  -troponin T x1 <0.010, troponin ordered  -monitor on telemetry     COPD  -not felt to be in exacerbation  -incentive spirometer   -continue with plan as outlined above    Hypokalemia  -Potassium is 3.4 (9/5)  -Was treated with 40 mg of p.o. potassium in the ER  -repeat pending with mag    Possible Ileus  -CT of the A/P showed Gas-filled small bowel loops suggesting a generalized ileus rather than a bowel obstruction.  -does not grimace to appear to be in pain when abdomen touched, bowel sounds present  -monitor, bowel regimen ordered     Distended urinary bladder  -UA is negative on admission  -CT of the A/P 9/5 showed potential mild bilateral hydronephrosis with no obstructing lesion   -bladder scan ordered   -renal function is WNL, monitor    Bilateral Breast Implants  -CT of the chest notes her right implant is ruptured and deflated     Dementia   -on Aricept at home per med-rec   -currently at baseline  -fall precautions  -daughter states that when she gets to feeling better she will likely wander and try to leave    Hx of esophageal Stricture   Malnourishment  -SLP evaluation ordered  -Nurse bedside swallow evaluation for now  -Nutrition consulted     Hx of Right Eye Uveitis   -s/p right eye removal in 2013     Former Smoker    DVT prophylaxis: Lovenox 30 mg SQ daily       Code Status and Medical Interventions:   Ordered at: 09/06/21 1630     Limited Support to NOT Include:    Intubation     Level Of Support Discussed With:    Next of Kin (If No Surrogate)     Code Status:    No CPR      Medical Interventions (Level of Support Prior to Arrest):    Limited     Comments:    NO intubation, NO CPR      Disposition home once clinically stable.     Laura Francois, STEPHANIE  09/06/21    ---------------------------------------------------------------------------------------------------------------------

## 2021-09-07 ENCOUNTER — APPOINTMENT (OUTPATIENT)
Dept: CARDIOLOGY | Facility: HOSPITAL | Age: 71
End: 2021-09-07

## 2021-09-07 ENCOUNTER — APPOINTMENT (OUTPATIENT)
Dept: GENERAL RADIOLOGY | Facility: HOSPITAL | Age: 71
End: 2021-09-07

## 2021-09-07 LAB
ANION GAP SERPL CALCULATED.3IONS-SCNC: 9.1 MMOL/L (ref 5–15)
BASOPHILS # BLD AUTO: 0.03 10*3/MM3 (ref 0–0.2)
BASOPHILS NFR BLD AUTO: 0.3 % (ref 0–1.5)
BH CV ECHO MEAS - AO ROOT AREA (BSA CORRECTED): 2.1
BH CV ECHO MEAS - AO ROOT AREA: 4.9 CM^2
BH CV ECHO MEAS - AO ROOT DIAM: 2.5 CM
BH CV ECHO MEAS - BSA(HAYCOCK): 1.1 M^2
BH CV ECHO MEAS - BSA: 1.2 M^2
BH CV ECHO MEAS - BZI_BMI: 9.4 KILOGRAMS/M^2
BH CV ECHO MEAS - BZI_METRIC_HEIGHT: 170.2 CM
BH CV ECHO MEAS - BZI_METRIC_WEIGHT: 27.2 KG
BH CV ECHO MEAS - EDV(CUBED): 17.6 ML
BH CV ECHO MEAS - EDV(TEICH): 24.6 ML
BH CV ECHO MEAS - EF(CUBED): 11.1 %
BH CV ECHO MEAS - EF(TEICH): 9.3 %
BH CV ECHO MEAS - ESV(CUBED): 15.6 ML
BH CV ECHO MEAS - ESV(TEICH): 22.3 ML
BH CV ECHO MEAS - FS: 3.8 %
BH CV ECHO MEAS - IVS/LVPW: 1.1
BH CV ECHO MEAS - IVSD: 1.1 CM
BH CV ECHO MEAS - LA DIMENSION: 2 CM
BH CV ECHO MEAS - LA/AO: 0.8
BH CV ECHO MEAS - LV MASS(C)D: 72.4 GRAMS
BH CV ECHO MEAS - LV MASS(C)DI: 59.7 GRAMS/M^2
BH CV ECHO MEAS - LVIDD: 2.6 CM
BH CV ECHO MEAS - LVIDS: 2.5 CM
BH CV ECHO MEAS - LVOT AREA (M): 2.5 CM^2
BH CV ECHO MEAS - LVOT AREA: 2.5 CM^2
BH CV ECHO MEAS - LVOT DIAM: 1.8 CM
BH CV ECHO MEAS - LVPWD: 1 CM
BH CV ECHO MEAS - PA ACC TIME: 0.11 SEC
BH CV ECHO MEAS - PA PR(ACCEL): 31.3 MMHG
BH CV ECHO MEAS - SI(CUBED): 1.6 ML/M^2
BH CV ECHO MEAS - SI(TEICH): 1.9 ML/M^2
BH CV ECHO MEAS - SV(CUBED): 2 ML
BH CV ECHO MEAS - SV(TEICH): 2.3 ML
BUN SERPL-MCNC: 10 MG/DL (ref 8–23)
BUN/CREAT SERPL: 30.3 (ref 7–25)
CALCIUM SPEC-SCNC: 8.2 MG/DL (ref 8.6–10.5)
CHLORIDE SERPL-SCNC: 103 MMOL/L (ref 98–107)
CO2 SERPL-SCNC: 24.9 MMOL/L (ref 22–29)
CREAT SERPL-MCNC: 0.33 MG/DL (ref 0.57–1)
DEPRECATED RDW RBC AUTO: 48.8 FL (ref 37–54)
EOSINOPHIL # BLD AUTO: 0 10*3/MM3 (ref 0–0.4)
EOSINOPHIL NFR BLD AUTO: 0 % (ref 0.3–6.2)
ERYTHROCYTE [DISTWIDTH] IN BLOOD BY AUTOMATED COUNT: 14.2 % (ref 12.3–15.4)
GFR SERPL CREATININE-BSD FRML MDRD: >150 ML/MIN/1.73
GLUCOSE BLDC GLUCOMTR-MCNC: 140 MG/DL (ref 70–130)
GLUCOSE BLDC GLUCOMTR-MCNC: 150 MG/DL (ref 70–130)
GLUCOSE BLDC GLUCOMTR-MCNC: 193 MG/DL (ref 70–130)
GLUCOSE BLDC GLUCOMTR-MCNC: 58 MG/DL (ref 70–130)
GLUCOSE BLDC GLUCOMTR-MCNC: 86 MG/DL (ref 70–130)
GLUCOSE SERPL-MCNC: 56 MG/DL (ref 65–99)
HCT VFR BLD AUTO: 34.8 % (ref 34–46.6)
HGB BLD-MCNC: 11.6 G/DL (ref 12–15.9)
IMM GRANULOCYTES # BLD AUTO: 0.05 10*3/MM3 (ref 0–0.05)
IMM GRANULOCYTES NFR BLD AUTO: 0.5 % (ref 0–0.5)
LYMPHOCYTES # BLD AUTO: 0.76 10*3/MM3 (ref 0.7–3.1)
LYMPHOCYTES NFR BLD AUTO: 7.6 % (ref 19.6–45.3)
MAXIMAL PREDICTED HEART RATE: 151 BPM
MCH RBC QN AUTO: 31.3 PG (ref 26.6–33)
MCHC RBC AUTO-ENTMCNC: 33.3 G/DL (ref 31.5–35.7)
MCV RBC AUTO: 93.8 FL (ref 79–97)
MONOCYTES # BLD AUTO: 0.47 10*3/MM3 (ref 0.1–0.9)
MONOCYTES NFR BLD AUTO: 4.7 % (ref 5–12)
NEUTROPHILS NFR BLD AUTO: 8.69 10*3/MM3 (ref 1.7–7)
NEUTROPHILS NFR BLD AUTO: 86.9 % (ref 42.7–76)
NRBC BLD AUTO-RTO: 0 /100 WBC (ref 0–0.2)
PLATELET # BLD AUTO: 289 10*3/MM3 (ref 140–450)
PMV BLD AUTO: 9.7 FL (ref 6–12)
POTASSIUM SERPL-SCNC: 3.5 MMOL/L (ref 3.5–5.2)
RBC # BLD AUTO: 3.71 10*6/MM3 (ref 3.77–5.28)
S PNEUM AG SPEC QL LA: NEGATIVE
SODIUM SERPL-SCNC: 137 MMOL/L (ref 136–145)
STRESS TARGET HR: 128 BPM
WBC # BLD AUTO: 10 10*3/MM3 (ref 3.4–10.8)

## 2021-09-07 PROCEDURE — 74230 X-RAY XM SWLNG FUNCJ C+: CPT

## 2021-09-07 PROCEDURE — 82962 GLUCOSE BLOOD TEST: CPT

## 2021-09-07 PROCEDURE — 85025 COMPLETE CBC W/AUTO DIFF WBC: CPT | Performed by: STUDENT IN AN ORGANIZED HEALTH CARE EDUCATION/TRAINING PROGRAM

## 2021-09-07 PROCEDURE — 80048 BASIC METABOLIC PNL TOTAL CA: CPT | Performed by: STUDENT IN AN ORGANIZED HEALTH CARE EDUCATION/TRAINING PROGRAM

## 2021-09-07 PROCEDURE — 93306 TTE W/DOPPLER COMPLETE: CPT

## 2021-09-07 PROCEDURE — 92611 MOTION FLUOROSCOPY/SWALLOW: CPT

## 2021-09-07 PROCEDURE — 74230 X-RAY XM SWLNG FUNCJ C+: CPT | Performed by: RADIOLOGY

## 2021-09-07 PROCEDURE — 25010000002 ENOXAPARIN PER 10 MG: Performed by: STUDENT IN AN ORGANIZED HEALTH CARE EDUCATION/TRAINING PROGRAM

## 2021-09-07 PROCEDURE — 25010000002 CEFTRIAXONE PER 250 MG: Performed by: STUDENT IN AN ORGANIZED HEALTH CARE EDUCATION/TRAINING PROGRAM

## 2021-09-07 PROCEDURE — 94799 UNLISTED PULMONARY SVC/PX: CPT

## 2021-09-07 PROCEDURE — 99232 SBSQ HOSP IP/OBS MODERATE 35: CPT | Performed by: STUDENT IN AN ORGANIZED HEALTH CARE EDUCATION/TRAINING PROGRAM

## 2021-09-07 PROCEDURE — 93306 TTE W/DOPPLER COMPLETE: CPT | Performed by: SPECIALIST

## 2021-09-07 RX ADMIN — DOXYCYCLINE 100 MG: 100 INJECTION, POWDER, LYOPHILIZED, FOR SOLUTION INTRAVENOUS at 11:41

## 2021-09-07 RX ADMIN — CEFTRIAXONE 1 G: 1 INJECTION, POWDER, FOR SOLUTION INTRAMUSCULAR; INTRAVENOUS at 23:48

## 2021-09-07 RX ADMIN — METRONIDAZOLE 500 MG: 500 INJECTION, SOLUTION INTRAVENOUS at 17:18

## 2021-09-07 RX ADMIN — DEXTROSE MONOHYDRATE 25 G: 500 INJECTION PARENTERAL at 06:17

## 2021-09-07 RX ADMIN — SODIUM CHLORIDE, PRESERVATIVE FREE 10 ML: 5 INJECTION INTRAVENOUS at 07:54

## 2021-09-07 RX ADMIN — ENOXAPARIN SODIUM 30 MG: 30 INJECTION SUBCUTANEOUS at 17:18

## 2021-09-07 RX ADMIN — SODIUM CHLORIDE, PRESERVATIVE FREE 10 ML: 5 INJECTION INTRAVENOUS at 22:00

## 2021-09-07 RX ADMIN — DEXTROSE MONOHYDRATE 25 G: 500 INJECTION PARENTERAL at 11:41

## 2021-09-07 RX ADMIN — METRONIDAZOLE 500 MG: 500 INJECTION, SOLUTION INTRAVENOUS at 07:55

## 2021-09-07 RX ADMIN — METRONIDAZOLE 500 MG: 500 INJECTION, SOLUTION INTRAVENOUS at 00:36

## 2021-09-07 NOTE — PROGRESS NOTES
[facility] HOSPITALIST PROGRESS NOTE     Patient Identification:  Name:  Radha Bacon  Age:  71 y.o.  Sex:  female  :  1950  MRN:  [id number]  Visit Number:  [id number]  ROOM: [address] [place] [address]     Primary Care Provider:  Noemi Tracy APRN    Length of stay in inpatient status:  1    Subjective     Chief Compliant:    Chief Complaint   Patient presents with   • Syncope       History of Presenting Illness: Seen and evaluated in follow-up for community-acquired pneumonia and syncopal episode.  Patient today without any acute complaints currently oxygenating well on 2 L nasal cannula, will attempt to titrate off.  Patient with some hypoglycemia earlier this morning while n.p.o. but improved once administration of D50 as well as reinitiation diet after SLP evaluation.    Objective     Current Hospital Meds:cefTRIAXone, 1 g, Intravenous, Q24H  doxycycline, 100 mg, Intravenous, Q12H  enoxaparin, 30 mg, Subcutaneous, Q24H  metroNIDAZOLE, 500 mg, Intravenous, Q8H  polyethylene glycol, 17 g, Oral, Daily  sodium chloride, 10 mL, Intravenous, Q12H         Current Antimicrobial Therapy:  Anti-Infectives (From admission, onward)    Ordered     Dose/Rate Route Frequency Start Stop    21 1536  cefTRIAXone (ROCEPHIN) 1 g in sodium chloride 0.9 % 100 mL IVPB-VTB     Ordering Provider: Herman Navas DO    1 g  200 mL/hr over 30 Minutes Intravenous Every 24 Hours 21 0000 21 2359    21 1536  doxycycline (VIBRAMYCIN) 100 mg in sodium chloride 0.9 % 100 mL IVPB-VTB     Ordering Provider: Herman Navas DO    100 mg  over 60 Minutes Intravenous Every 12 Hours 21 0000 21 2359    21 1627  metroNIDAZOLE (FLAGYL) 500 mg/100mL IVPB     Ordering Provider: aLura Francois APRN    500 mg Intravenous Every 8 Hours 21 1715 21 1714    21 0058  cefTRIAXone (ROCEPHIN) 1 g in sodium chloride 0.9 % 100 mL IVPB-VTB     Ordering Provider: Cornell Berman MD    1  g  200 mL/hr over 30 Minutes Intravenous Once 09/06/21 0100 09/06/21 0234    09/06/21 0058  doxycycline (VIBRAMYCIN) 100 mg in sodium chloride 0.9 % 100 mL IVPB-VTB     Ordering Provider: Cornell Berman MD    100 mg  over 60 Minutes Intravenous Once 09/06/21 0100 09/06/21 0234        Current Diuretic Therapy:  Diuretics (From admission, onward)    None        ----------------------------------------------------------------------------------------------------------------------  Vital Signs:  Temp:  [97.8 °F (36.6 °C)-98 °F (36.7 °C)] 98 °F (36.7 °C)  Heart Rate:  [80-85] 80  Resp:  [16-18] 18  BP: (123-128)/(65-78) 128/78  SpO2:  [94 %-99 %] 95 %  on  Flow (L/min):  [2] 2;   Device (Oxygen Therapy): nasal cannula  Body mass index is 9.43 kg/m².    Wt Readings from Last 3 Encounters:   09/06/21 27.3 kg (60 lb 3.2 oz)   08/23/21 34 kg (75 lb)   10/01/20 36.3 kg (80 lb)     Intake & Output (last 3 days)       09/05 0701 - 09/06 0700 09/06 0701 - 09/07 0700 09/07 0701 - 09/08 0700    P.O.  0 480    I.V. (mL/kg)   297.6 (10.9)    IV Piggyback 1200      Total Intake(mL/kg) 1200 (35.3) 0 (0) 777.6 (28.5)    Urine (mL/kg/hr)  850 (1.3) 325 (1)    Stool  0     Total Output  850 325    Net +1200 -850 +452.6           Stool Unmeasured Occurrence  0 x         Diet Soft Texture; Ground; Thin  ----------------------------------------------------------------------------------------------------------------------  Physical exam:  Constitutional: Elderly frail cachectic thin female resting in bed, NAD   HENT:  Head:  Normocephalic and atraumatic.  Mouth:  Moist mucous membranes.    Eyes: Right eye surgically absent..    Neck:  Neck supple.  No JVD present.    Cardiovascular:  Normal rate, regular rhythm and normal heart sounds.  Pulmonary/Chest:  No respiratory distress, no wheezes, no crackles, with normal breath sounds and good air movement.  Abdominal:  Soft.  Bowel sounds are normal.  No distension and no tenderness.    Musculoskeletal:  No edema, no tenderness, and no deformity.  Diffuse muscle wasting of the extremities present..   Neurological:  Alert and and answers simple questions but unable to provide more complex answers or mumble more than a few words.  No focal neurological deficits on exam though.    Skin:  Skin is warm and dry.   Peripheral vascular:  Pulses in all 4 extremities with no clubbing, no cyanosis, no edema.  Psychiatric: Appropriate mood and affect, pleasant.   ----------------------------------------------------------------------------------------------------------------------  Tele:    ----------------------------------------------------------------------------------------------------------------------  Results from last 7 days   Lab Units 09/07/21  0438 09/06/21  1542 09/06/21  0138 09/05/21  2311 09/05/21  2203   CRP mg/dL  --  0.45  --   --   --    LACTATE mmol/L  --   --  1.9  --   --    WBC 10*3/mm3 10.00 10.32  --   --  11.06*   HEMOGLOBIN g/dL 11.6* 13.1  --   --  13.0   HEMATOCRIT % 34.8 40.5  --   --  41.2   MCV fL 93.8 93.8  --   --  96.5   MCHC g/dL 33.3 32.3  --   --  31.6   PLATELETS 10*3/mm3 289 278  --   --  222   INR   --   --   --  0.97  --          Results from last 7 days   Lab Units 09/07/21  0438 09/06/21  1542 09/05/21 2203   SODIUM mmol/L 137 134* 136   POTASSIUM mmol/L 3.5 4.2 3.4*   MAGNESIUM mg/dL  --  2.1  --    CHLORIDE mmol/L 103 103 101   CO2 mmol/L 24.9 20.2* 23.9   BUN mg/dL 10 9 14   CREATININE mg/dL 0.33* 0.39* 0.59   EGFR IF NONAFRICN AM mL/min/1.73 >150 >150 101   CALCIUM mg/dL 8.2* 8.4* 8.3*   GLUCOSE mg/dL 56* 69 84   ALBUMIN g/dL  --  2.71* 3.07*   BILIRUBIN mg/dL  --  0.5 0.4   ALK PHOS U/L  --  191* 183*   AST (SGOT) U/L  --  85* 59*   ALT (SGPT) U/L  --  75* 65*   Estimated Creatinine Clearance: 28.6 mL/min (A) (by C-G formula based on SCr of 0.33 mg/dL (L)).  No results found for: AMMONIA  Results from last 7 days   Lab Units 09/06/21  1542 09/05/21 2203    TROPONIN T ng/mL <0.010 <0.010     Results from last 7 days   Lab Units 09/05/21  2203   PROBNP pg/mL 892.8         Glucose   Date/Time Value Ref Range Status   09/07/2021 1716 86 70 - 130 mg/dL Final     Comment:     Meter: [id number] : [id number] Osman GAMBOA   09/07/2021 1208 140 (H) 70 - 130 mg/dL Final     Comment:     Meter: [id number] : [id number] cassidykt simmons   09/07/2021 1135 58 (L) 70 - 130 mg/dL Final     Comment:     Meter: [id number] : [id number] cassidy simmons   09/07/2021 0655 193 (H) 70 - 130 mg/dL Final     Comment:     Meter: [id number] : [id number] MELODY VILLELA   09/07/2021 0015 150 (H) 70 - 130 mg/dL Final     Comment:     Meter: [id number] : [id number] MAQBOOL FRANCISCA   09/06/2021 2327 51 (L) 70 - 130 mg/dL Final     Comment:     Meter: [id number] : [id number] morales mohsen   09/06/2021 1954 79 70 - 130 mg/dL Final     Comment:     Meter: [id number] : [id number] MAQBOOL AHMAD   09/06/2021 1728 139 (H) 70 - 130 mg/dL Final     Comment:     Meter: [id number] : [id number] Brittany Collett     Lab Results   Component Value Date    TSH 5.350 (H) 09/05/2021     No results found for: PREGTESTUR, PREGSERUM, HCG, HCGQUANT  Pain Management Panel    There is no flowsheet data to display.       Brief Urine Lab Results  (Last result in the past 365 days)      Color   Clarity   Blood   Leuk Est   Nitrite   Protein   CREAT   Urine HCG        09/05/21 2308 Yellow Clear Negative Negative Negative Negative             Blood Culture   Date Value Ref Range Status   09/06/2021 No growth at 24 hours  Preliminary   09/06/2021 No growth at 24 hours  Preliminary     No results found for: URINECX  No results found for: WOUNDCX  No results found for: STOOLCX  No results found for: RESPCX  No results found for: AFBCX  Results from last 7 days   Lab Units 09/06/21  1542 09/06/21  0138   LACTATE mmol/L  --  1.9   CRP mg/dL 0.45  --        I have personally looked at the labs and they  are summarized above.  ----------------------------------------------------------------------------------------------------------------------  Detailed radiology reports for the last 24 hours:    Imaging Results (Last 24 Hours)     Procedure Component Value Units Date/Time    FL Video Swallow Single Contrast [[id number]] Collected: 09/07/21 1048     Updated: 09/07/21 1105    Narrative:      FL VIDEO SWALLOW SINGLE-CONTRAST-     CLINICAL INDICATION: Aspiration; J18.9-Pneumonia, unspecified organism;  R55-Syncope and collapse.        TECHNIQUE:   Speech therapy service was present. The patient was examined in the  sitting lateral position and was given several consistencies of barium.     FINDINGS: Premature loss of all consistencies to bilateral pyriforms,  controlled. Deep laryngeal penetration, leading to silent aspiration  during the swallow with nectar thick and thin liquids, 2/2 only large  cup bolus. No aspiration via other presentation styles.      No other laryngeal  penetration or aspiration evidenced.            FLUOROSCOPY TIME: 2.1 minutes.     IMAGES: Cine loop was acquired.       Impression:      Aspiration with nectar thickened and thin liquids.     For additional information please see the report provided by the speech  therapy service.     This report was finalized on 9/7/2021 11:02 AM by Dr. Blue Coley MD.           Assessment & Plan      Multi-lobar pneumonia    -Patient currently oxygenating well on 2 L nasal cannula will titrate as tolerated    -Given risk for aspiration patient additionally on Flagyl in addition to Rocephin and doxycycline for empiric community-acquired coverage    -Respiratory PCR panel, strep pneumo antigen, Legionella antigen all negative    -We will attempt to stepdown to oral therapy as soon as able but will plan for initial 48 to 72 hours of IV antibiotics    Syncopal episode    -Patient with syncopal episode at home after patient's daughter found her leaned against a  wall at the bathroom and that she then dropped her head and went limp.    -Continue continuous cardiac monitoring    -Patient with hypoglycemia this morning while n.p.o. certainly some component of that could have been in play as patient's glucose was not initially checked as far as we can tell by EMS at time of initial transfer to the ER.  However noted patient's glucose was 84 at time of presentation to the ER and did get as low as 50 while being evaluated.    -TTE obtained shows EF of 61 to 65% with normal LV SF, unfortunately LV diastolic function was not assessed due to technically limited study.    Possible ileus    -Patient with CT of the abdomen pelvis that showed gas-filled small bowel loops suggesting a generalized ileus rather than bowel obstruction at time of her initial evaluation    -Patient has shown no tenderness to palpation, no complaints of abdominal pain and bowel sounds are active throughout    Mild bilateral hydronephrosis    -No obstructing lesions noted on CT on 9/5    -UA negative, renal function within normal limits, continue catheter for now we will attempt to remove and monitor urine output tomorrow.    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      VTE Prophylaxis:   Mechanical Order History:     None      Pharmalogical Order History:      Ordered     Dose Route Frequency Stop    09/06/21 1541  enoxaparin (LOVENOX) syringe 30 mg      30 mg SC Every 24 Hours --    09/06/21 1536  Pharmacy to Dose enoxaparin (LOVENOX)  Status:  Discontinued     Question:  Indication of use  Answer:  Prophylaxis    -- XX Continuous PRN  09/06/21 1541                Disposition patient previously at home with daughter however plan to transition to [facility] this past weekend and can be discharged there once medically stable as long as bed still available.    Herman Navas DO  [facility]  09/07/21  19:29 EDT

## 2021-09-07 NOTE — CASE MANAGEMENT/SOCIAL WORK
Discharge Planning Assessment  [facility]     Patient Name: Radha Bacon  MRN: [id number]  Today's Date: 9/7/2021    Admit Date: 9/5/2021    Discharge Needs Assessment     Row Name 09/07/21 1139       Living Environment    Lives With  child(nani), adult    Name(s) of Who Lives With Patient  Vangie    Current Living Arrangements  home/apartment/condo    Primary Care Provided by  child(nani)    Provides Primary Care For  no one    Family Caregiver if Needed  child(nani), adult    Family Caregiver Names  Vangie    Quality of Family Relationships  helpful;involved;supportive    Able to Return to Prior Arrangements  no       Resource/Environmental Concerns    Resource/Environmental Concerns  none       Transition Planning    Patient/Family Anticipates Transition to  long-term care facility    Patient/Family Anticipated Services at Transition  skilled nursing    Transportation Anticipated  health plan transportation       Discharge Needs Assessment    Equipment Currently Used at Home  none    Concerns to be Addressed  no discharge needs identified    Anticipated Changes Related to Illness  none    Equipment Needed After Discharge  none    Outpatient/Agency/Support Group Needs  skilled nursing facility    Discharge Facility/Level of Care Needs  nursing facility, skilled        Discharge Plan     Row Name 09/07/21 1140       Plan    Plan SS spoke with pt's daughter/Vangie AVELAR on this date. Pt has been living at home with daughter. Pt does not utilize DME at this time. Pt's PCP is Bi Tracy. Pt's daughter states they have been working with [facility] where pt was supposed to be admitted over the weekend. SS spoke with Marylin at [facility] who confirmed provided information although they cannot guarantee a bed will be available at time of discharge. SS to notifiy [facility] when pt is ready for discharge and fax updated clinical information. Dr. Navas notified. Pt will need EMS arranged for transportation. SS to follow  and assist.    Patient/Family in Agreement with Plan  yes        Demographic Summary     Row Name 09/07/21 9160       General Information    Referral Source  nursing    Reason for Consult  -- d/c planning; dementia; lives with daughter (POA); weight 60 lbs        JOSE Mota

## 2021-09-07 NOTE — NURSING NOTE
Patient resting in bed. Continues to be calm but restless.  Pulled her IV sited and will not keep the oxygen or pulse ox probe.  Started a new IV and wrapped with gauze. Ear pulse ox probe tried. Patient keeps taking that off. Talked to patient's daughter on the phone. Bed alarm on and Call bell in reach.

## 2021-09-07 NOTE — MBS/VFSS/FEES
Acute Care - Speech Language Pathology   Swallow Initial Evaluation NEVA Treadwell   MODIFIED BARIUM SWALLOW STUDY     Patient Name: Radha Bacon  : 1950  MRN: [id number]  Today's Date: 2021             Admit Date: 2021    Radha Bacon  presents to the radiology suite this am from 3347/2S to participate in an instrumental MBS to evaluate safety/efficacy of swallowing fnx, determine safest/least restrictive diet.    Ms. Bacon was admitted 21 from her private residence 2/2 s/p syncopal episode. She presented w/ PNA, malnourishment w/ cachexia, COPD exacerbation. She is referred to r/o dysphagia, as she was reported w/ h/o recommended esophageal dilation, which was never performed.       Social History     Socioeconomic History   • Marital status:      Spouse name: Not on file   • Number of children: Not on file   • Years of education: Not on file   • Highest education level: Not on file   Tobacco Use   • Smoking status: Never Smoker   • Smokeless tobacco: Never Used      Imaging:   CT Chest WO     INDICATION:   Syncopal episode. Patient unresponsive.     TECHNIQUE:   CT of the thorax without IV contrast. Coronal and sagittal reconstructions were obtained.  Radiation dose reduction techniques included automated exposure control or exposure modulation based on body size. Count of known CT and cardiac nuc med studies  performed in previous 12 months: 2.      COMPARISON:   2018     FINDINGS:  Exam is degraded by streak artifact from the patient's arms. Patient is cachectic. There does appear to be generalized anasarca. Patient has bilateral breast implants. The right side is ruptured and deflated. There is diffuse atherosclerotic disease and  coronary artery disease. No pleural or pericardial effusion is identified. There is no definite adenopathy. Lung windows show COPD. There are tree in bud infiltrates in the lower lobes, right middle lobe, and lingula suggesting an acute  infection.  Imaging features are atypical or uncommonly reported for COVID-19 pneumonia. Alternative diagnoses should be considered. No dense alveolar consolidations are seen. There is some mucous plugging in right lower lobe bronchi.     IMPRESSION:     1. COPD with mild bilateral tree-in-bud-type pneumonia as above.  2. Atherosclerotic disease and coronary artery disease.  3. Cachexia with mild generalized anasarca.     Signer Name: Michel Mata MD   Signed: 9/6/2021 12:11 AM   Workstation Name: [email or web address]    [facility]     Labs:  09/07/21 0703  POC Glucose Once   Collected: 09/07/21 0655  Final result  Specimen: Blood    Glucose 193High  mg/dL             09/07/21 0533  Basic Metabolic Panel   Collected: 09/07/21 0438  Final result  Specimen: Blood    Glucose 56Low  mg/dL CO2 24.9 mmol/L   BUN 10 mg/dL Calcium 8.2Low  mg/dL   Creatinine 0.33Low  mg/dL eGFR Non African Amer >150 mL/min/1.73   Sodium 137 mmol/L BUN/Creatinine Ratio 30.3High    Potassium 3.5 mmol/L Anion Gap 9.1 mmol/L   Chloride 103 mmol/L            09/07/21 0508  CBC & Differential   Collected: 09/07/21 0438  Final result  Specimen: Blood           09/07/21 0508  CBC Auto Differential   Collected: 09/07/21 0438  Final result  Specimen: Blood    WBC 10.00 10*3/mm3 Lymphocyte % 7.6Low  %   RBC 3.71Low  10*6/mm3 Monocyte % 4.7Low  %   Hemoglobin 11.6Low  g/dL Eosinophil % 0.0Low  %   Hematocrit 34.8 % Basophil % 0.3 %   MCV 93.8 fL Immature Grans % 0.5 %   MCH 31.3 pg Neutrophils, Absolute 8.69High  10*3/mm3   MCHC 33.3 g/dL Lymphocytes, Absolute 0.76 10*3/mm3   RDW 14.2 % Monocytes, Absolute 0.47 10*3/mm3   RDW-SD 48.8 fl Eosinophils, Absolute 0.00 10*3/mm3   MPV 9.7 fL Basophils, Absolute 0.03 10*3/mm3   Platelets 289 10*3/mm3 Immature Grans, Absolute 0.05 10*3/mm3   Neutrophil % 86.9High  % nRBC 0.0 /100 WBC          09/07/21 0022  POC Glucose Once   Collected: 09/07/21 0015  Final result  Specimen: Blood     Glucose 150High  mg/dL         Diet Orders (active) (From admission, onward)     Start     Ordered    09/07/21 1109  Diet Soft Texture; Ground; Thin  Diet Effective Now     Comments: All liquids in lidded cup/straw only. No open cups, please.    09/07/21 1109              Observed on O2 via NC 2L.     Risks and benefits of the procedure are explained w/ verbalizing understanding/agreement to participate. Proceed per protocol.      She is positioned upright and centered in a soft strap supportive chair to accept multiple po presentations of solid cracker, puree, honey thick, nectar thick, and thin liquids via spoon, cup and straw, along w/ whole placebo pill in puree. Pt is able to self feed w/ standby assistance during this procedure, however, suspect she will benefit from 1:1 assistance during meals 2/2 ams.     All views are from the lateral plane.     Facial/oral structures are symmetrical upon observation w/o lingual deviation upon protrusion. Oral mucosa are moist, pink and clean. Secretions are clear, thin and well controlled. OROM/ASHLEY is wfl to imitate oral postures. Gag is not assessed. Volitional cough is adequate in intensity, clear in quality, nonproductive. Vocal quality is adequate in intensity, clear in quality w/ intelligible speech. Pt is a/a and cooperative to particpate.  Pt  is oriented to person, place, and time, follows simple directives, and participates in simple conversational exchanges.     Upon po presentations, adequate bolus anticipation w/ good labial seal for bolus clearance via spoon bowl, cup rim stability and suction via straw.  Bolus formation, manipulation,  and control are moderately weak in general w/ mixed phasic/ rotary mastication pattern. A-p transit is slightly delayed w/ piecemeal deglutition of solids. No significant oral residue. Tongue base retraction and linguavelar seal are weak w/ premature spillage to the bilateral pyriform sinuses, controlled. No laryngeal  penetration or aspiration is evidenced before the swallow.     Pharyngeal swallow is timely w/ weak hyolaryngeal elevation and slightly delayed epiglottic inversion. Deep laryngeal penetration of nectar thick and thin liquids occurs during the swallow, leading to silent aspiration during the swallow of both. This occurs only w/ large, uncontrolled cup presentations. No laryngeal penetration or aspiration evidenced w/ small cup presentations, straw presentations. No other laryngeal penetration or aspiration evidenced during or after the swallow.     Full esophageal sweep reveals mild delay in esophageal peristalsis w/ all consistencies w/ retrograde flow to mid esophagus w/ delayed esophageal clearance. Please see full radiology report for details.       Visit Dx:     ICD-10-CM ICD-9-CM   1. Pneumonia due to infectious organism, unspecified laterality, unspecified part of lung  J18.9 486   2. Syncope, unspecified syncope type  R55 780.2     Patient Active Problem List   Diagnosis   • Pneumonia due to infectious organism     Past Medical History:   Diagnosis Date   • Dementia (CMS/HCC)      Past Surgical History:   Procedure Laterality Date   • AUGMENTATION MAMMAPLASTY Bilateral    • BREAST AUGMENTATION     •  SECTION     • EYE SURGERY Right     prostetic right eye   • GALLBLADDER SURGERY     • HYSTERECTOMY     • TONSILLECTOMY       Impression: Ms. Bacon presents w/mild-moderate oropharyngeal swallow w/ deep laryngeal penetration of nectar thick and thin liquids occurs during the swallow, leading to silent aspiration during the swallow of both. This occurs only w/ large, uncontrolled cup presentations. No laryngeal penetration or aspiration evidenced w/ small cup presentations, straw presentations. No other laryngeal penetration or aspiration evidenced during or after the swallow.     Full esophageal sweep reveals mild delay in esophageal peristalsis w/ all consistencies w/ retrograde flow to mid  esophagus w/ delayed esophageal clearance. Please see full radiology report for details.      Per this evaluation, Ms. Bacon is felt to most benefit from modified po diet of mechanical soft, ground meats, thin liquids. ALL liquids via lidded up or straw only. No open cups. She will benefit from 1:1 assistance w/all po intake 2/2 ams and generalized weakness/fatigue. This is felt to represent her least restrictive modified po diet.     EDUCATION  The patient has been educated in the following areas:   Dysphagia (Swallowing Impairment) Oral Care/Hydration Modified Diet Instruction.     SLP Recommendation and Plan    1. Mechanical soft diet, ground meats, thin liquids. ALL liquids via lidded cup or straw only. No open cups, please.  2. Medications crushed in puree/thins.    3. 1:1 assistance w/all po intake to encourage po acceptance/safety w/ po intake.   3. Universal aspiration precautions.   5. JORGE LUIS precautions.   6. Oral care protocol.   SLP to f/u for diet safety/acceptance    D/w Ms. Bacon results and recommendations w/ verbal understanding and agreement.     Communicated to Dr. Navas and RN results and recommendations w/ verbal understanding and agreement.     Ms. Bacon  was not wearing a face mask during this therapy encounter. Therapist used appropriate personal protective equipment including mask, eye protection and gloves.  Mask used was standard procedure mask. Appropriate PPE was worn during the entire therapy session. The patient coughed during this evaluation. Therapist was within 6 feet for 15 minutes or more during the evaluation. Hand hygiene was completed before and after therapy session. Patient is not in enhanced droplet precautions.     Thank you for allowing me to participate in the care of your patient-  Goldie Farr M.S. [facility]/SLP                                                                                  Time Calculation:   Time Calculation- SLP     Time Calculation- SLP     Row Name  09/07/21 1110      SLP Received On  09/07/21  -Recorded by: VILMA     SLP - Next Appointment  09/08/21  -Recorded by: VILMA             User Key     Initials Name Provider Type    Goldie Cam, MS CCC-SLP Speech and Language Pathologist          Therapy Charges for Today     Code Description Service Date Service Provider Modifiers Qty    [id number] HC ST MOTION FLUORO EVAL SWALLOW 8 9/7/2021 Goldie Farr, MS CCC-SLP GN 1               Goldie Farr MS CCC-SLP  9/7/2021

## 2021-09-08 PROBLEM — E43 SEVERE MALNUTRITION (HCC): Status: ACTIVE | Noted: 2021-09-08

## 2021-09-08 LAB
ALBUMIN SERPL-MCNC: 3.1 G/DL (ref 3.5–5.2)
ALBUMIN/GLOB SERPL: 1.2 G/DL
ALP SERPL-CCNC: 176 U/L (ref 39–117)
ALT SERPL W P-5'-P-CCNC: 46 U/L (ref 1–33)
ANION GAP SERPL CALCULATED.3IONS-SCNC: 9.8 MMOL/L (ref 5–15)
AST SERPL-CCNC: 25 U/L (ref 1–32)
BILIRUB SERPL-MCNC: 0.3 MG/DL (ref 0–1.2)
BUN SERPL-MCNC: 12 MG/DL (ref 8–23)
BUN/CREAT SERPL: 22.2 (ref 7–25)
CALCIUM SPEC-SCNC: 8.5 MG/DL (ref 8.6–10.5)
CHLORIDE SERPL-SCNC: 98 MMOL/L (ref 98–107)
CO2 SERPL-SCNC: 26.2 MMOL/L (ref 22–29)
CREAT SERPL-MCNC: 0.54 MG/DL (ref 0.57–1)
DEPRECATED RDW RBC AUTO: 51.1 FL (ref 37–54)
ERYTHROCYTE [DISTWIDTH] IN BLOOD BY AUTOMATED COUNT: 14.5 % (ref 12.3–15.4)
GFR SERPL CREATININE-BSD FRML MDRD: 112 ML/MIN/1.73
GLOBULIN UR ELPH-MCNC: 2.6 GM/DL
GLUCOSE BLDC GLUCOMTR-MCNC: 17 MG/DL (ref 70–130)
GLUCOSE BLDC GLUCOMTR-MCNC: 180 MG/DL (ref 70–130)
GLUCOSE BLDC GLUCOMTR-MCNC: 25 MG/DL (ref 70–130)
GLUCOSE BLDC GLUCOMTR-MCNC: 53 MG/DL (ref 70–130)
GLUCOSE BLDC GLUCOMTR-MCNC: 59 MG/DL (ref 70–130)
GLUCOSE BLDC GLUCOMTR-MCNC: 71 MG/DL (ref 70–130)
GLUCOSE BLDC GLUCOMTR-MCNC: 89 MG/DL (ref 70–130)
GLUCOSE BLDC GLUCOMTR-MCNC: 99 MG/DL (ref 70–130)
GLUCOSE SERPL-MCNC: 67 MG/DL (ref 65–99)
HCT VFR BLD AUTO: 40.8 % (ref 34–46.6)
HGB BLD-MCNC: 12.9 G/DL (ref 12–15.9)
MCH RBC QN AUTO: 30.6 PG (ref 26.6–33)
MCHC RBC AUTO-ENTMCNC: 31.6 G/DL (ref 31.5–35.7)
MCV RBC AUTO: 96.7 FL (ref 79–97)
PLATELET # BLD AUTO: 306 10*3/MM3 (ref 140–450)
PMV BLD AUTO: 9.7 FL (ref 6–12)
POTASSIUM SERPL-SCNC: 3.3 MMOL/L (ref 3.5–5.2)
PROT SERPL-MCNC: 5.7 G/DL (ref 6–8.5)
RBC # BLD AUTO: 4.22 10*6/MM3 (ref 3.77–5.28)
SODIUM SERPL-SCNC: 134 MMOL/L (ref 136–145)
WBC # BLD AUTO: 8.85 10*3/MM3 (ref 3.4–10.8)

## 2021-09-08 PROCEDURE — 82962 GLUCOSE BLOOD TEST: CPT

## 2021-09-08 PROCEDURE — 25010000002 CEFTRIAXONE PER 250 MG: Performed by: STUDENT IN AN ORGANIZED HEALTH CARE EDUCATION/TRAINING PROGRAM

## 2021-09-08 PROCEDURE — 25010000002 ENOXAPARIN PER 10 MG: Performed by: STUDENT IN AN ORGANIZED HEALTH CARE EDUCATION/TRAINING PROGRAM

## 2021-09-08 PROCEDURE — 85027 COMPLETE CBC AUTOMATED: CPT | Performed by: STUDENT IN AN ORGANIZED HEALTH CARE EDUCATION/TRAINING PROGRAM

## 2021-09-08 PROCEDURE — 92610 EVALUATE SWALLOWING FUNCTION: CPT

## 2021-09-08 PROCEDURE — 80053 COMPREHEN METABOLIC PANEL: CPT | Performed by: STUDENT IN AN ORGANIZED HEALTH CARE EDUCATION/TRAINING PROGRAM

## 2021-09-08 PROCEDURE — 99233 SBSQ HOSP IP/OBS HIGH 50: CPT | Performed by: STUDENT IN AN ORGANIZED HEALTH CARE EDUCATION/TRAINING PROGRAM

## 2021-09-08 PROCEDURE — 94799 UNLISTED PULMONARY SVC/PX: CPT

## 2021-09-08 RX ADMIN — DOXYCYCLINE 100 MG: 100 INJECTION, POWDER, LYOPHILIZED, FOR SOLUTION INTRAVENOUS at 21:52

## 2021-09-08 RX ADMIN — METRONIDAZOLE 500 MG: 500 INJECTION, SOLUTION INTRAVENOUS at 01:57

## 2021-09-08 RX ADMIN — DEXTROSE MONOHYDRATE 25 G: 500 INJECTION PARENTERAL at 11:08

## 2021-09-08 RX ADMIN — DOXYCYCLINE 100 MG: 100 INJECTION, POWDER, LYOPHILIZED, FOR SOLUTION INTRAVENOUS at 13:13

## 2021-09-08 RX ADMIN — METRONIDAZOLE 500 MG: 500 INJECTION, SOLUTION INTRAVENOUS at 17:28

## 2021-09-08 RX ADMIN — METRONIDAZOLE 500 MG: 500 INJECTION, SOLUTION INTRAVENOUS at 08:27

## 2021-09-08 RX ADMIN — CEFTRIAXONE 1 G: 1 INJECTION, POWDER, FOR SOLUTION INTRAMUSCULAR; INTRAVENOUS at 21:52

## 2021-09-08 RX ADMIN — ENOXAPARIN SODIUM 30 MG: 30 INJECTION SUBCUTANEOUS at 18:29

## 2021-09-08 RX ADMIN — POLYETHYLENE GLYCOL (3350) 17 G: 17 POWDER, FOR SOLUTION ORAL at 08:27

## 2021-09-08 RX ADMIN — SODIUM CHLORIDE, PRESERVATIVE FREE 10 ML: 5 INJECTION INTRAVENOUS at 08:27

## 2021-09-08 RX ADMIN — DOXYCYCLINE 100 MG: 100 INJECTION, POWDER, LYOPHILIZED, FOR SOLUTION INTRAVENOUS at 00:48

## 2021-09-08 NOTE — DISCHARGE PLACEMENT REQUEST
"Radha Bacon (71 y.o. Female)     Date of Birth Social Security Number Address Home Phone MRN    1950  [address] [phone] [id number]    Hinduism Marital Status          Scientologist        Admission Date Admission Type Admitting Provider Attending Provider Department, Room/Bed    9/5/21 Emergency Herman Navas DO Cagle, William, DO [address], 3347/2S    Discharge Date Discharge Disposition Discharge Destination                       Attending Provider: Herman Navas DO    Allergies: No Known Allergies    Isolation: None   Infection: None   Code Status: No CPR    Ht: 170.2 cm (67\")   Wt: 27.3 kg (60 lb 3.2 oz)    Admission Cmt: None   Principal Problem: None                Active Insurance as of 9/5/2021     Primary Coverage     Payor Plan Insurance Group Employer/Plan Group    MEDICARE MEDICARE A & B      Payor Plan Address Payor Plan Phone Number Payor Plan Fax Number Effective Dates    [address] [phone]  3/1/2015 - None Entered    [facility] [address]       Subscriber Name Subscriber Birth Date Member ID       RADHA BACON 1950 [id number]           Secondary Coverage     Payor Plan Insurance Group Employer/Plan Group     FOR LIFE  FOR LIFE  SUP       Payor Plan Address Payor Plan Phone Number Payor Plan Fax Number Effective Dates    [address] [phone]  1/15/2018 - None Entered    [facility] [id number]       Subscriber Name Subscriber Birth Date Member ID       RADHA BACON 1950 [id number]                 Emergency Contacts      (Rel.) Home Phone Work Phone Mobile Phone    Vangie Chu (Daughter) -- -- [phone]               History & Physical      Laura Francois APRN at 09/06/21 1516     Attestation signed by Herman Navas DO at 09/07/21 0754    Patient seen and examined at bedside.  Patient is pleasently demented but does answer some questions appropriately rarely.  " Denies any complaints. Will obtain TTE and orthostatics.  She is very emaciated and cachectic, will get nutrition to see.  Empiric pna coverage with flagyl with concern for possible aspiration.  SLP pending evaluation. Mild bilat hydronephrosis will keep aguilera in place for now and attempt bladder training soon. Have otherwise reviewed and agree with documentation below.                     [facility] Medicine Services  History & Physical          Patient Identification:  Name:  Radha Bacon  Age:  71 y.o.  Sex:  female  :  1950  MRN:  [id number]   Admit Date: 2021   Visit Number:  [id number]  Primary Care Physician:  Noemi Tracy APRN    I have seen the patient in conjunction with STEPHANIE Medrano and I agree with the following statements:     Subjective     Chief complaint: low bp/near syncope episode     History of presenting illness:    Mrs. Bacon is a 71 year old male patient who presented to [facility] ED on 2021. Due to her  Underlying dementia she is unable to give any HPI/ROS or medical history, I called her daughter, Vangie who is her POA and who she lives with. Her daughter states she was with her and  had been outside smoking, she came back in and heard bump on the wall, Mrs. Bacon wasn't in her recliner where she usually sits during the day, she saw her leaned against the wall at the bathroom, she states she dropped her head she thought she had a stroke, she went to grab her and she went limp, her daughter assisted her to the floor, she had low blood pressure when EMS they arrived. She  have episode of incontinence. She has not been ill with anything recently. She denies any recent diarrhea or vomiting, no recent med changes.    Her treatment in the ER included Rocephin 1 g IV x1, doxycycline 100 mg IV x1, DuoNeb x1, potassium 40 mEq p.o. x1, 1 L normal saline.Her v/s in the ED were temp 97.1, HR 58-79, RR 18-20, BP 92//79.     Her past medical  history is significant for dementia (was dx by Dr. Newman), strokes in the past (was told this by dr. 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 She is a former smoker she quit around 3 years ago,  No history of drug or alcohol abuse. She does have a daughter Vangie castaneda, who is her POA and a son.  in , she has lived with her daughter since, worsening dementia since. She does have issues with aspiration, she needed her esophageus stretched several years ago and wouldn't do it. She had her second dose of Pfizer on 2021. She is a DNR/DNI as discussed with her daughter via phone.       ---------------------------------------------------------------------------------------------------------------------   Review of Systems   Reason unable to perform ROS: unable to obtain from patient due to mental status       ---------------------------------------------------------------------------------------------------------------------   Past Medical History:   Diagnosis Date   • Dementia      Past Surgical History:   Procedure Laterality Date   • AUGMENTATION MAMMAPLASTY Bilateral    • BREAST AUGMENTATION     •  SECTION     • EYE SURGERY Right     prostetic right eye   • GALLBLADDER SURGERY     • HYSTERECTOMY     • TONSILLECTOMY       Family History   Problem Relation Age of Onset   • Breast cancer Maternal Aunt      Social History     Socioeconomic History   • Marital status:      Spouse name: Not on file   • Number of children: Not on file   • Years of education: Not on file   • Highest education level: Not on file   Tobacco Use   • Smoking status: Never Smoker   • Smokeless tobacco: Never Used     ---------------------------------------------------------------------------------------------------------------------   Allergies:  Patient has no known  allergies.  ---------------------------------------------------------------------------------------------------------------------     Medications below are reported home medications pulling from within the system; at this time, these medications have not been reconciled unless otherwise specified and are in the verification process for further verifcation as current home medications.    Prior to Admission Medications     Prescriptions Last Dose Informant Patient Reported? Taking?    amLODIPine (NORVASC) 10 MG tablet   Yes No    Take 10 mg by mouth Daily.    donepezil (ARICEPT) 5 MG tablet   Yes No    Take 5 mg by mouth Every Night.    doxycycline (MONODOX) 100 MG capsule   No No    Take 1 capsule by mouth 2 (Two) Times a Day.    ondansetron ODT (ZOFRAN-ODT) 4 MG disintegrating tablet   No No    Take 1 tablet by mouth Every 6 (Six) Hours As Needed for Nausea or Vomiting.    predniSONE (DELTASONE) 50 MG tablet   No No    Take 1 tablet by mouth Daily.    Vortioxetine HBr (TRINTELLIX) 5 MG tablet   Yes No    Take 5 mg by mouth Daily With Breakfast.        Hospital Scheduled Meds:        ---------------------------------------------------------------------------------------------------------------------   Objective       Vital Signs:  Temp:  [97.1 °F (36.2 °C)] 97.1 °F (36.2 °C)  Heart Rate:  [58-79] 61  Resp:  [18-20] 18  BP: ()/(60-94) 125/79      09/05/21  0412   Weight: 34 kg (75 lb)     Body mass index is 11.75 kg/m².  ---------------------------------------------------------------------------------------------------------------------       Physical Exam    Physical Exam:  Constitutional:  cachetic thin female in on distress  HENT:  Head: Normocephalic and atraumatic.  Mouth:  Moist mucous membranes.    Eyes:  Right eye has been surgical removed, eye lid is matted shut (nurse to clean eye)  Neck:  Neck supple.  No JVD present.    Cardiovascular:  Normal rate, regular rhythm.  with no murmur.  Pulmonary/Chest:   No respiratory distress, no wheezes, diminished   Abdominal:  Soft.  Bowel sounds are present.  No distension and no tenderness.   Musculoskeletal:  No edema, no tenderness, and no deformity.  Atrophied leg muscles  Neurological: awake and alert, does not answer any questions, continuously mumbles random words, her daughter reports this is her normal  Skin:  Skin is warm and dry.  No rash noted.  No pallor.   Psychiatric:  Normal mood and affect.  Behavior is normal.  Judgment and thought content normal.   Peripheral vascular:  No edema and strong pulses on all 4 extremities.  ---------------------------------------------------------------------------------------------------------------------  EKG:          ---------------------------------------------------------------------------------------------------------------------   Results from last 7 days   Lab Units 09/06/21  0138 09/05/21  2311 09/05/21  2203   LACTATE mmol/L 1.9  --   --    WBC 10*3/mm3  --   --  11.06*   HEMOGLOBIN g/dL  --   --  13.0   HEMATOCRIT %  --   --  41.2   MCV fL  --   --  96.5   MCHC g/dL  --   --  31.6   PLATELETS 10*3/mm3  --   --  222   INR   --  0.97  --          Results from last 7 days   Lab Units 09/05/21  2203   SODIUM mmol/L 136   POTASSIUM mmol/L 3.4*   CHLORIDE mmol/L 101   CO2 mmol/L 23.9   BUN mg/dL 14   CREATININE mg/dL 0.59   EGFR IF NONAFRICN AM mL/min/1.73 101   CALCIUM mg/dL 8.3*   GLUCOSE mg/dL 84   ALBUMIN g/dL 3.07*   BILIRUBIN mg/dL 0.4   ALK PHOS U/L 183*   AST (SGOT) U/L 59*   ALT (SGPT) U/L 65*   Estimated Creatinine Clearance: 35.6 mL/min (by C-G formula based on SCr of 0.59 mg/dL).  No results found for: AMMONIA  Results from last 7 days   Lab Units 09/05/21  2203   TROPONIN T ng/mL <0.010     Results from last 7 days   Lab Units 09/05/21  2203   PROBNP pg/mL 892.8     No results found for: HGBA1C  Lab Results   Component Value Date    TSH 1.650 06/10/2019     No results found for: PREGTESTUR, PREGSERUM, HCG,  HCGQUANT  Pain Management Panel    There is no flowsheet data to display.       No results found for: BLOODCX  No results found for: URINECX  No results found for: WOUNDCX  No results found for: STOOLCX      ---------------------------------------------------------------------------------------------------------------------  Imaging Results (Last 7 Days)     Procedure Component Value Units Date/Time    CT Abdomen Pelvis Without Contrast [[id number]] Collected: 09/06/21 0018     Updated: 09/06/21 0020    Narrative:      CT Abdomen Pelvis WO    INDICATION:   Syncope.    TECHNIQUE:   CT of the abdomen and pelvis without IV contrast. Coronal and sagittal reconstructions were obtained.  Radiation dose reduction techniques included automated exposure control or exposure modulation based on body size. Count of known CT and cardiac nuc  med studies performed in previous 12 months: 2.     COMPARISON:   2/16/2018    FINDINGS:  Please see chest CT report dictated separately. Patient is cachectic. There is streak artifact from the patient's arms. Additionally, the exam is degraded by the lack of IV contrast, as well as the lack of intra-abdominal fat as a natural contrast  medium. There is diffuse atherosclerotic disease. There is no aortic aneurysm. Gallbladder is surgically absent. There is generalized anasarca, and I cannot exclude the possibility of some ascites. Urinary bladder is mildly distended. Uterus is  surgically absent. There is equivocal mild bilateral hydronephrosis. No definite renal or ureteral stones are seen. Solid abdominal organs are otherwise grossly normal although fairly poorly characterized. The GI tract is poorly characterized. There is  no definitive evidence of acute colitis. There are some gas-filled small bowel loops suggesting a generalized ileus. The appendix is not definitely identifiable.      Impression:        1. Markedly technically degraded exam as above.  2. Cachexia with anasarca and  potentially some ascites.  3. Distended urinary bladder. Additionally, there is potential mild bilateral hydronephrosis with no obstructing lesion identified. Patient may benefit from Puri placement.  4. Gas-filled small bowel loops suggesting a generalized ileus rather than a bowel obstruction.  5. No definite renal or ureteral stones.  6. Cholecystectomy and hysterectomy.          Signer Name: Michel Mata MD   Signed: 9/6/2021 12:18 AM   Workstation Name: [email or web address]    [facility]    CT Chest Without Contrast Diagnostic [[id number]] Collected: 09/06/21 0011     Updated: 09/06/21 0013    Narrative:      CT Chest WO    INDICATION:   Syncopal episode. Patient unresponsive.    TECHNIQUE:   CT of the thorax without IV contrast. Coronal and sagittal reconstructions were obtained.  Radiation dose reduction techniques included automated exposure control or exposure modulation based on body size. Count of known CT and cardiac nuc med studies  performed in previous 12 months: 2.     COMPARISON:   11/14/2018    FINDINGS:  Exam is degraded by streak artifact from the patient's arms. Patient is cachectic. There does appear to be generalized anasarca. Patient has bilateral breast implants. The right side is ruptured and deflated. There is diffuse atherosclerotic disease and  coronary artery disease. No pleural or pericardial effusion is identified. There is no definite adenopathy. Lung windows show COPD. There are tree in bud infiltrates in the lower lobes, right middle lobe, and lingula suggesting an acute infection.  Imaging features are atypical or uncommonly reported for COVID-19 pneumonia. Alternative diagnoses should be considered. No dense alveolar consolidations are seen. There is some mucous plugging in right lower lobe bronchi.      Impression:        1. COPD with mild bilateral tree-in-bud-type pneumonia as above.  2. Atherosclerotic disease and coronary artery disease.  3. Cachexia with  mild generalized anasarca.    Signer Name: Michel Mata MD   Signed: 9/6/2021 12:11 AM   [email or web address]    [facility]    CT Head Without Contrast [[id number]] Collected: 09/05/21 2350     Updated: 09/05/21 2352    Narrative:      CT Head WO: 9/6/2021 12:43 AM    HISTORY:   Syncope, unresponsive episode    TECHNIQUE:   Axial unenhanced head CT. Radiation dose reduction techniques included automated exposure control or exposure modulation based on body size. Radiation audit for number of CT and nuclear cardiology exams performed in the last year: 0.      COMPARISON:   CT of the head from 10/1/2020  FINDINGS:   No intracranial hemorrhage, mass, or infarct. No hydrocephalus or extra-axial fluid collection. There is brain parenchymal volume loss. The skull base, calvarium, and extracranial soft tissues are normal apart from a right globe prosthesis.      Impression:      No acute intracranial abnormality.          Signer Name: Julisa Collado MD   Signed: 9/5/2021 11:50 PM   Workstation Name: [id number]    [facility]    XR Chest 1 View [[id number]] Collected: 09/05/21 2229     Updated: 09/05/21 2231    Narrative:      CR Chest 1 Vw    INDICATION:   COPD exacerbation. Patient became unresponsive.     COMPARISON:    8/23/2021    FINDINGS:  Portable AP view(s) of the chest.  Cardiac and mediastinal contours are normal. COPD is present. Pulmonary vascularity is normal. The lungs are clear. No pneumothorax. Deflated right breast implant is again noted.      Impression:      No acute cardiopulmonary findings.    Signer Name: Michel Mata MD   Signed: 9/5/2021 10:29 PM   Workstation Name: [email or web address]    [facility]          ---------------------------------------------------------------------------------------------------------------------  Assessment / Plan       Assessment and Plan:    SIRS 2/2 PNA (WBC 11.06)  -COVID-19 negative  -2 sets of  blood cultures collected in the ER  -CT of the chest shows There are tree in bud infiltrates in the lower lobes, right middle lobe, and lingula suggesting an acute infection.  -SLP evaluation ordered  -Rocephin, Doxy and Flagyl ordered  -DuoNebs ordered  -Respiratory panel PCR, legionella and strep pneumo antigen ordered   -repeat labs in the am     Elevated Transaminases   -ALT 65  -AST 59  -Alkaline Phosphatase 183  -Acute Hepatitis panel negative  -Alcohol level negative  -Acetaminophen level is negative   -CT of the A/P showed There is generalized anasarca, and I cannot exclude the possibility of some ascites.  -Repeat CMP In the am, monitor abdomen     Near Syncope vs Syncopal Episode   -CT of the head showed no acute intracranial abnormality   -had her Norvasc at home on 9/5  -had 1 liter of NS in the ER  -orthostatic vs ordered   -Fall precautions  -troponin T x1 <0.010, troponin ordered  -monitor on telemetry     COPD  -not felt to be in exacerbation  -incentive spirometer   -continue with plan as outlined above    Hypokalemia  -Potassium is 3.4 (9/5)  -Was treated with 40 mg of p.o. potassium in the ER  -repeat pending with mag    Possible Ileus  -CT of the A/P showed Gas-filled small bowel loops suggesting a generalized ileus rather than a bowel obstruction.  -does not grimace to appear to be in pain when abdomen touched, bowel sounds present  -monitor, bowel regimen ordered     Distended urinary bladder  -UA is negative on admission  -CT of the A/P 9/5 showed potential mild bilateral hydronephrosis with no obstructing lesion   -bladder scan ordered   -renal function is WNL, monitor    Bilateral Breast Implants  -CT of the chest notes her right implant is ruptured and deflated     Dementia   -on Aricept at home per med-rec   -currently at baseline  -fall precautions  -daughter states that when she gets to feeling better she will likely wander and try to leave    Hx of esophageal Stricture    Malnourishment  -SLP evaluation ordered  -Nurse bedside swallow evaluation for now  -Nutrition consulted     Hx of Right Eye Uveitis   -s/p right eye removal in 2013     Former Smoker    DVT prophylaxis: Lovenox 30 mg SQ daily       Code Status and Medical Interventions:   Ordered at: 09/06/21 1630     Limited Support to NOT Include:    Intubation     Level Of Support Discussed With:    Next of Kin (If No Surrogate)     Code Status:    No CPR     Medical Interventions (Level of Support Prior to Arrest):    Limited     Comments:    NO intubation, NO CPR      Disposition home once clinically stable.     Laura Francois, STEPHANIE  09/06/21    ---------------------------------------------------------------------------------------------------------------------       Electronically signed by Herman Navas DO at 09/07/21 0753       Vital Signs (last day)     Date/Time   Temp   Temp src   Pulse   Resp   BP   Patient Position   SpO2    09/07/21 2116   --   --   --   --   --   --   95    09/07/21 1811   98 (36.7)   Axillary   80   18   128/78   Lying   95    09/07/21 0700   97.8 (36.6)   Axillary   85   16   123/65   Lying   94              Intake & Output (last day)       09/07 0701 - 09/08 0700 09/08 0701 - 09/09 0700    P.O. 600     I.V. (mL/kg) 297.6 (10.9)     Total Intake(mL/kg) 897.6 (32.9)     Urine (mL/kg/hr) 875 (1.3)     Stool 0     Total Output 875     Net +22.6           Stool Unmeasured Occurrence 1 x           Current Facility-Administered Medications   Medication Dose Route Frequency Provider Last Rate Last Admin   • cefTRIAXone (ROCEPHIN) 1 g in sodium chloride 0.9 % 100 mL IVPB-VTB  1 g Intravenous Q24H Herman Navas  mL/hr at 09/07/21 2348 1 g at 09/07/21 2348   • dextrose (D50W) 25 g/ 50mL Intravenous Solution 25 g  25 g Intravenous Q15 Min PRN Laura Francois, APRN   25 g at 09/07/21 1141   • dextrose (GLUTOSE) oral gel 15 g  15 g Oral Q15 Min PRN Laura Francois, APRN       •  doxycycline (VIBRAMYCIN) 100 mg in sodium chloride 0.9 % 100 mL IVPB-VTB  100 mg Intravenous Q12H Herman Navas DO   100 mg at 09/08/21 0048   • enoxaparin (LOVENOX) syringe 30 mg  30 mg Subcutaneous Q24H Herman Navas DO   30 mg at 09/07/21 1718   • glucagon (human recombinant) (GLUCAGEN DIAGNOSTIC) injection 1 mg  1 mg Subcutaneous Q15 Min PRN Laura Francois APRN       • metroNIDAZOLE (FLAGYL) 500 mg/100mL IVPB  500 mg Intravenous Q8H Laura Francois APRN   500 mg at 09/08/21 0827   • polyethylene glycol (MIRALAX) packet 17 g  17 g Oral Daily Laura Francois APRN   17 g at 09/08/21 0827   • sodium chloride 0.9 % flush 10 mL  10 mL Intravenous PRN Cornell Berman MD       • sodium chloride 0.9 % flush 10 mL  10 mL Intravenous Q12H Herman Navas DO   10 mL at 09/08/21 0827   • sodium chloride 0.9 % flush 10 mL  10 mL Intravenous PRN Herman Navas DO         Lab Results (most recent)     Procedure Component Value Units Date/Time    POC Glucose Once [[id number]]  (Abnormal) Collected: 09/08/21 0627    Specimen: Blood Updated: 09/08/21 0633     Glucose 59 mg/dL      Comment: Meter: [id number] : [id number] BELTRAN PATRICK       Comprehensive Metabolic Panel [[id number]]  (Abnormal) Collected: 09/08/21 0518    Specimen: Blood Updated: 09/08/21 0617     Glucose 67 mg/dL      BUN 12 mg/dL      Creatinine 0.54 mg/dL      Sodium 134 mmol/L      Potassium 3.3 mmol/L      Chloride 98 mmol/L      CO2 26.2 mmol/L      Calcium 8.5 mg/dL      Total Protein 5.7 g/dL      Albumin 3.10 g/dL      ALT (SGPT) 46 U/L      AST (SGOT) 25 U/L      Alkaline Phosphatase 176 U/L      Total Bilirubin 0.3 mg/dL      eGFR Non African Amer 112 mL/min/1.73      Globulin 2.6 gm/dL      A/G Ratio 1.2 g/dL      BUN/Creatinine Ratio 22.2     Anion Gap 9.8 mmol/L     Narrative:      GFR Normal >60  Chronic Kidney Disease <60  Kidney Failure <15      CBC (No Diff) [[id number]]  (Normal) Collected: 09/08/21 0511     Specimen: Blood Updated: 09/08/21 0551     WBC 8.85 10*3/mm3      RBC 4.22 10*6/mm3      Hemoglobin 12.9 g/dL      Hematocrit 40.8 %      MCV 96.7 fL      MCH 30.6 pg      MCHC 31.6 g/dL      RDW 14.5 %      RDW-SD 51.1 fl      MPV 9.7 fL      Platelets 306 10*3/mm3     Blood Culture - Blood, Arm, Right [[id number]] Collected: 09/06/21 0130    Specimen: Blood from Arm, Right Updated: 09/08/21 0145     Blood Culture No growth at 2 days    Blood Culture - Blood, Arm, Left [[id number]] Collected: 09/06/21 0138    Specimen: Blood from Arm, Left Updated: 09/08/21 0145     Blood Culture No growth at 2 days    POC Glucose Once [[id number]]  (Normal) Collected: 09/08/21 0032    Specimen: Blood Updated: 09/08/21 0039     Glucose 71 mg/dL      Comment: Meter: [id number] : [id number] kishan AVENDAÑO Pneumo Ag Urine or CSF - Urine, Urine, Clean Catch [[id number]]  (Normal) Collected: 09/06/21 1852    Specimen: Urine, Clean Catch Updated: 09/07/21 1635     Strep Pneumo Ag Negative    Basic Metabolic Panel [[id number]]  (Abnormal) Collected: 09/07/21 0438    Specimen: Blood Updated: 09/07/21 0533     Glucose 56 mg/dL      BUN 10 mg/dL      Creatinine 0.33 mg/dL      Sodium 137 mmol/L      Potassium 3.5 mmol/L      Chloride 103 mmol/L      CO2 24.9 mmol/L      Calcium 8.2 mg/dL      eGFR Non African Amer >150 mL/min/1.73      BUN/Creatinine Ratio 30.3     Anion Gap 9.1 mmol/L     Narrative:      GFR Normal >60  Chronic Kidney Disease <60  Kidney Failure <15      CBC & Differential [[id number]]  (Abnormal) Collected: 09/07/21 0438    Specimen: Blood Updated: 09/07/21 0508    Narrative:      The following orders were created for panel order CBC & Differential.  Procedure                               Abnormality         Status                     ---------                               -----------         ------                     CBC Auto Differential[[id number]]        Abnormal            Final result                 Please  view results for these tests on the individual orders.    CBC Auto Differential [[id number]]  (Abnormal) Collected: 09/07/21 0438    Specimen: Blood Updated: 09/07/21 0508     WBC 10.00 10*3/mm3      RBC 3.71 10*6/mm3      Hemoglobin 11.6 g/dL      Hematocrit 34.8 %      MCV 93.8 fL      MCH 31.3 pg      MCHC 33.3 g/dL      RDW 14.2 %      RDW-SD 48.8 fl      MPV 9.7 fL      Platelets 289 10*3/mm3      Neutrophil % 86.9 %      Lymphocyte % 7.6 %      Monocyte % 4.7 %      Eosinophil % 0.0 %      Basophil % 0.3 %      Immature Grans % 0.5 %      Neutrophils, Absolute 8.69 10*3/mm3      Lymphocytes, Absolute 0.76 10*3/mm3      Monocytes, Absolute 0.47 10*3/mm3      Eosinophils, Absolute 0.00 10*3/mm3      Basophils, Absolute 0.03 10*3/mm3      Immature Grans, Absolute 0.05 10*3/mm3      nRBC 0.0 /100 WBC     Legionella Antigen, Urine - Urine, Urine, Clean Catch [[id number]]  (Normal) Collected: 09/06/21 1852    Specimen: Urine, Clean Catch Updated: 09/06/21 1940     LEGIONELLA ANTIGEN, URINE Negative    Narrative:      Presumptive negative for L. pneumophilia serogroup 1 antigen, suggesting no recent or current infection.    Troponin [[id number]]  (Normal) Collected: 09/06/21 1542    Specimen: Blood Updated: 09/06/21 1714     Troponin T <0.010 ng/mL     Narrative:      Troponin T Reference Range:  <= 0.03 ng/mL-   Negative for AMI  >0.03 ng/mL-     Abnormal for myocardial necrosis.  Clinicians would have to utilize clinical acumen, EKG, Troponin and serial changes to determine if it is an Acute Myocardial Infarction or myocardial injury due to an underlying chronic condition.       Results may be falsely decreased if patient taking Biotin.      Comprehensive Metabolic Panel [[id number]]  (Abnormal) Collected: 09/06/21 1542    Specimen: Blood Updated: 09/06/21 1619     Glucose 69 mg/dL      BUN 9 mg/dL      Creatinine 0.39 mg/dL      Sodium 134 mmol/L      Potassium 4.2 mmol/L      Comment: Slight hemolysis detected by  analyzer. Results may be affected.        Chloride 103 mmol/L      CO2 20.2 mmol/L      Calcium 8.4 mg/dL      Total Protein 5.8 g/dL      Albumin 2.71 g/dL      ALT (SGPT) 75 U/L      AST (SGOT) 85 U/L      Alkaline Phosphatase 191 U/L      Total Bilirubin 0.5 mg/dL      eGFR Non African Amer >150 mL/min/1.73      Globulin 3.1 gm/dL      A/G Ratio 0.9 g/dL      BUN/Creatinine Ratio 23.1     Anion Gap 10.8 mmol/L     Narrative:      GFR Normal >60  Chronic Kidney Disease <60  Kidney Failure <15      C-reactive Protein [[id number]]  (Normal) Collected: 09/06/21 1542    Specimen: Blood Updated: 09/06/21 1619     C-Reactive Protein 0.45 mg/dL     Magnesium [[id number]]  (Normal) Collected: 09/06/21 1542    Specimen: Blood Updated: 09/06/21 1619     Magnesium 2.1 mg/dL     Hepatitis Panel, Acute [[id number]]  (Normal) Collected: 09/05/21 2203    Specimen: Blood from Arm, Left Updated: 09/06/21 1618     Hepatitis B Surface Ag Non-Reactive     Hep A IgM Non-Reactive     Hep B C IgM Non-Reactive     Hepatitis C Ab Non-Reactive    Narrative:      Results may be falsely decreased if patient taking Biotin.     TSH [[id number]]  (Abnormal) Collected: 09/05/21 2203    Specimen: Blood from Arm, Left Updated: 09/06/21 1605     TSH 5.350 uIU/mL     CBC & Differential [[id number]]  (Abnormal) Collected: 09/06/21 1542    Specimen: Blood Updated: 09/06/21 1559    Narrative:      The following orders were created for panel order CBC & Differential.  Procedure                               Abnormality         Status                     ---------                               -----------         ------                     CBC Auto Differential[[id number]]        Abnormal            Final result                 Please view results for these tests on the individual orders.    CBC Auto Differential [[id number]]  (Abnormal) Collected: 09/06/21 1542    Specimen: Blood Updated: 09/06/21 1559     WBC 10.32 10*3/mm3      RBC 4.32 10*6/mm3       Hemoglobin 13.1 g/dL      Hematocrit 40.5 %      MCV 93.8 fL      MCH 30.3 pg      MCHC 32.3 g/dL      RDW 14.4 %      RDW-SD 49.0 fl      MPV 9.6 fL      Platelets 278 10*3/mm3      Neutrophil % 88.1 %      Lymphocyte % 6.5 %      Monocyte % 4.6 %      Eosinophil % 0.1 %      Basophil % 0.3 %      Immature Grans % 0.4 %      Neutrophils, Absolute 9.10 10*3/mm3      Lymphocytes, Absolute 0.67 10*3/mm3      Monocytes, Absolute 0.47 10*3/mm3      Eosinophils, Absolute 0.01 10*3/mm3      Basophils, Absolute 0.03 10*3/mm3      Immature Grans, Absolute 0.04 10*3/mm3      nRBC 0.0 /100 WBC     Ethanol [[id number]] Collected: 09/05/21 2203    Specimen: Blood from Arm, Left Updated: 09/06/21 1557     Ethanol <10 mg/dL      Ethanol % <0.010 %     Narrative:      >/= 80.0 legally intoxicated    Acetaminophen Level [[id number]]  (Normal) Collected: 09/05/21 2203    Specimen: Blood from Arm, Left Updated: 09/06/21 1557     Acetaminophen <5.0 mcg/mL     Lactic Acid, Plasma [[id number]]  (Normal) Collected: 09/06/21 0138    Specimen: Blood from Arm, Left Updated: 09/06/21 0202     Lactate 1.9 mmol/L     Protime-INR [[id number]]  (Normal) Collected: 09/05/21 2311    Specimen: Blood from Arm, Left Updated: 09/05/21 2326     Protime 13.3 Seconds      INR 0.97    Narrative:      Suggested INR therapeutic range for stable oral anticoagulant therapy:    Low Intensity therapy:   1.5-2.0  Moderate Intensity therapy:   2.0-3.0  High Intensity therapy:   2.5-4.0    Urinalysis With Microscopic If Indicated (No Culture) - Urine, Clean Catch [[id number]]  (Normal) Collected: 09/05/21 2308    Specimen: Urine, Clean Catch Updated: 09/05/21 2318     Color, UA Yellow     Appearance, UA Clear     pH, UA 6.5     Specific Gravity, UA 1.012     Glucose, UA Negative     Ketones, UA Negative     Bilirubin, UA Negative     Blood, UA Negative     Protein, UA Negative     Leuk Esterase, UA Negative     Nitrite, UA Negative     Urobilinogen, UA 1.0 E.U./dL     Narrative:      Urine microscopic not indicated.    [place] Draw [[id number]] Collected: 09/05/21 2203    Specimen: Blood from Arm, Left Updated: 09/05/21 2316    Narrative:      The following orders were created for panel order [place] Draw.  Procedure                               Abnormality         Status                     ---------                               -----------         ------                     Green Top (Gel)[[id number]]                                  Final result               Lavender Top[[id number]]                                     Final result               Gold Top - SST[[id number]]                                   Final result               Light Blue Top[[id number]]                                   Final result                 Please view results for these tests on the individual orders.    Green Top (Gel) [[id number]] Collected: 09/05/21 2203    Specimen: Blood from Arm, Left Updated: 09/05/21 2316     Extra Tube Hold for add-ons.     Comment: Auto resulted.       Light Blue Top [[id number]] Collected: 09/05/21 2203    Specimen: Blood from Arm, Left Updated: 09/05/21 2316     Extra Tube hold for add-on     Comment: Auto resulted       Lavender Top [[id number]] Collected: 09/05/21 2203    Specimen: Blood from Arm, Left Updated: 09/05/21 2316     Extra Tube hold for add-on     Comment: Auto resulted       Gold Top - SST [[id number]] Collected: 09/05/21 2203    Specimen: Blood from Arm, Left Updated: 09/05/21 2316     Extra Tube Hold for add-ons.     Comment: Auto resulted.       Troponin [[id number]]  (Normal) Collected: 09/05/21 2203    Specimen: Blood from Arm, Left Updated: 09/05/21 2236     Troponin T <0.010 ng/mL     Narrative:      Troponin T Reference Range:  <= 0.03 ng/mL-   Negative for AMI  >0.03 ng/mL-     Abnormal for myocardial necrosis.  Clinicians would have to utilize clinical acumen, EKG, Troponin and serial changes to determine if it is an Acute Myocardial Infarction or  myocardial injury due to an underlying chronic condition.       Results may be falsely decreased if patient taking Biotin.      BNP [[id number]]  (Normal) Collected: 09/05/21 2203    Specimen: Blood from Arm, Left Updated: 09/05/21 2234     proBNP 892.8 pg/mL     Narrative:      Among patients with dyspnea, NT-proBNP is highly sensitive for the detection of acute congestive heart failure. In addition NT-proBNP of <300 pg/ml effectively rules out acute congestive heart failure with 99% negative predictive value.    Results may be falsely decreased if patient taking Biotin.      COVID-19 and FLU A/B PCR - Swab, Nasopharynx [[id number]]  (Normal) Collected: 09/05/21 2133    Specimen: Swab from Nasopharynx Updated: 09/05/21 2156     COVID19 Not Detected     Influenza A PCR Not Detected     Influenza B PCR Not Detected    Narrative:      Fact sheet for providers: https://www.fda.gov/media/537659/download    Fact sheet for patients: https://www.fda.gov/media/097645/download    Test performed by PCR.          Imaging Results (Most Recent)     Procedure Component Value Units Date/Time    FL Video Swallow Single Contrast [[id number]] Collected: 09/07/21 1048     Updated: 09/07/21 1105    Narrative:      FL VIDEO SWALLOW SINGLE-CONTRAST-     CLINICAL INDICATION: Aspiration; J18.9-Pneumonia, unspecified organism;  R55-Syncope and collapse.        TECHNIQUE:   Speech therapy service was present. The patient was examined in the  sitting lateral position and was given several consistencies of barium.     FINDINGS: Premature loss of all consistencies to bilateral pyriforms,  controlled. Deep laryngeal penetration, leading to silent aspiration  during the swallow with nectar thick and thin liquids, 2/2 only large  cup bolus. No aspiration via other presentation styles.      No other laryngeal  penetration or aspiration evidenced.            FLUOROSCOPY TIME: 2.1 minutes.     IMAGES: Cine loop was acquired.       Impression:       Aspiration with nectar thickened and thin liquids.     For additional information please see the report provided by the speech  therapy service.     This report was finalized on 9/7/2021 11:02 AM by Dr. Blue Coley MD.       CT Abdomen Pelvis Without Contrast [[id number]] Collected: 09/06/21 0018     Updated: 09/06/21 0020    Narrative:      CT Abdomen Pelvis WO    INDICATION:   Syncope.    TECHNIQUE:   CT of the abdomen and pelvis without IV contrast. Coronal and sagittal reconstructions were obtained.  Radiation dose reduction techniques included automated exposure control or exposure modulation based on body size. Count of known CT and cardiac nuc  med studies performed in previous 12 months: 2.     COMPARISON:   2/16/2018    FINDINGS:  Please see chest CT report dictated separately. Patient is cachectic. There is streak artifact from the patient's arms. Additionally, the exam is degraded by the lack of IV contrast, as well as the lack of intra-abdominal fat as a natural contrast  medium. There is diffuse atherosclerotic disease. There is no aortic aneurysm. Gallbladder is surgically absent. There is generalized anasarca, and I cannot exclude the possibility of some ascites. Urinary bladder is mildly distended. Uterus is  surgically absent. There is equivocal mild bilateral hydronephrosis. No definite renal or ureteral stones are seen. Solid abdominal organs are otherwise grossly normal although fairly poorly characterized. The GI tract is poorly characterized. There is  no definitive evidence of acute colitis. There are some gas-filled small bowel loops suggesting a generalized ileus. The appendix is not definitely identifiable.      Impression:        1. Markedly technically degraded exam as above.  2. Cachexia with anasarca and potentially some ascites.  3. Distended urinary bladder. Additionally, there is potential mild bilateral hydronephrosis with no obstructing lesion identified. Patient may benefit from  Puri placement.  4. Gas-filled small bowel loops suggesting a generalized ileus rather than a bowel obstruction.  5. No definite renal or ureteral stones.  6. Cholecystectomy and hysterectomy.          Signer Name: Michel Mata MD   Signed: 9/6/2021 12:18 AM   Workstation Name: [facility]    CT Chest Without Contrast Diagnostic [[id number]] Collected: 09/06/21 0011     Updated: 09/06/21 0013    Narrative:      CT Chest WO    INDICATION:   Syncopal episode. Patient unresponsive.    TECHNIQUE:   CT of the thorax without IV contrast. Coronal and sagittal reconstructions were obtained.  Radiation dose reduction techniques included automated exposure control or exposure modulation based on body size. Count of known CT and cardiac nuc med studies  performed in previous 12 months: 2.     COMPARISON:   11/14/2018    FINDINGS:  Exam is degraded by streak artifact from the patient's arms. Patient is cachectic. There does appear to be generalized anasarca. Patient has bilateral breast implants. The right side is ruptured and deflated. There is diffuse atherosclerotic disease and  coronary artery disease. No pleural or pericardial effusion is identified. There is no definite adenopathy. Lung windows show COPD. There are tree in bud infiltrates in the lower lobes, right middle lobe, and lingula suggesting an acute infection.  Imaging features are atypical or uncommonly reported for COVID-19 pneumonia. Alternative diagnoses should be considered. No dense alveolar consolidations are seen. There is some mucous plugging in right lower lobe bronchi.      Impression:        1. COPD with mild bilateral tree-in-bud-type pneumonia as above.  2. Atherosclerotic disease and coronary artery disease.  3. Cachexia with mild generalized anasarca.    Signer Name: Michel Mata MD   Signed: 9/6/2021 12:11 AM   Workstation Name: [facility]    CT Head Without  Contrast [[id number]] Collected: 21     Updated: 21    Narrative:      CT Head WO: 2021 12:43 AM    HISTORY:   Syncope, unresponsive episode    TECHNIQUE:   Axial unenhanced head CT. Radiation dose reduction techniques included automated exposure control or exposure modulation based on body size. Radiation audit for number of CT and nuclear cardiology exams performed in the last year: 0.      COMPARISON:   CT of the head from 10/1/2020  FINDINGS:   No intracranial hemorrhage, mass, or infarct. No hydrocephalus or extra-axial fluid collection. There is brain parenchymal volume loss. The skull base, calvarium, and extracranial soft tissues are normal apart from a right globe prosthesis.      Impression:      No acute intracranial abnormality.          Signer Name: Julisa Collado MD   Signed: 2021 11:50 PM   Workstation Name: [id number]    [facility]    XR Chest 1 View [[id number]] Collected: 21     Updated: 21    Narrative:      CR Chest 1 Vw    INDICATION:   COPD exacerbation. Patient became unresponsive.     COMPARISON:    2021    FINDINGS:  Portable AP view(s) of the chest.  Cardiac and mediastinal contours are normal. COPD is present. Pulmonary vascularity is normal. The lungs are clear. No pneumothorax. Deflated right breast implant is again noted.      Impression:      No acute cardiopulmonary findings.    Signer Name: Michel Mata MD   Signed: 2021 10:29 PM   Workstation Name: [id number]    [facility]        Operative/Procedure Notes (most recent note)    No notes of this type exist for this encounter.            Physician Progress Notes (most recent note)      Herman Navas DO at 21 1928              [facility]     Patient Identification:  Name:  Radha Bacon  Age:  71 y.o.  Sex:  female  :  1950  MRN:  [id number]  Visit Number:  [id number]  ROOM:  3347/2S     Primary Care Provider:  Noemi Tracy APRN    Length of stay in inpatient status:  1    Subjective     Chief Compliant:    Chief Complaint   Patient presents with   • Syncope       History of Presenting Illness: Seen and evaluated in follow-up for community-acquired pneumonia and syncopal episode.  Patient today without any acute complaints currently oxygenating well on 2 L nasal cannula, will attempt to titrate off.  Patient with some hypoglycemia earlier this morning while n.p.o. but improved once administration of D50 as well as reinitiation diet after SLP evaluation.    Objective     Current Hospital Meds:cefTRIAXone, 1 g, Intravenous, Q24H  doxycycline, 100 mg, Intravenous, Q12H  enoxaparin, 30 mg, Subcutaneous, Q24H  metroNIDAZOLE, 500 mg, Intravenous, Q8H  polyethylene glycol, 17 g, Oral, Daily  sodium chloride, 10 mL, Intravenous, Q12H         Current Antimicrobial Therapy:  Anti-Infectives (From admission, onward)    Ordered     Dose/Rate Route Frequency Start Stop    09/06/21 1536  cefTRIAXone (ROCEPHIN) 1 g in sodium chloride 0.9 % 100 mL IVPB-VTB     Ordering Provider: Herman Navas DO    1 g  200 mL/hr over 30 Minutes Intravenous Every 24 Hours 09/07/21 0000 09/12/21 2359    09/06/21 1536  doxycycline (VIBRAMYCIN) 100 mg in sodium chloride 0.9 % 100 mL IVPB-VTB     Ordering Provider: Herman Navas DO    100 mg  over 60 Minutes Intravenous Every 12 Hours 09/07/21 0000 09/11/21 2359    09/06/21 1627  metroNIDAZOLE (FLAGYL) 500 mg/100mL IVPB     Ordering Provider: Laura Francois APRN    500 mg Intravenous Every 8 Hours 09/06/21 1715 09/13/21 1714    09/06/21 0058  cefTRIAXone (ROCEPHIN) 1 g in sodium chloride 0.9 % 100 mL IVPB-VTB     Ordering Provider: Cornell Berman MD    1 g  200 mL/hr over 30 Minutes Intravenous Once 09/06/21 0100 09/06/21 0234    09/06/21 0058  doxycycline (VIBRAMYCIN) 100 mg in sodium chloride 0.9 % 100 mL IVPB-VTB     Ordering Provider: Cornell Berman  MD Hayden    100 mg  over 60 Minutes Intravenous Once 09/06/21 0100 09/06/21 0234        Current Diuretic Therapy:  Diuretics (From admission, onward)    None        ----------------------------------------------------------------------------------------------------------------------  Vital Signs:  Temp:  [97.8 °F (36.6 °C)-98 °F (36.7 °C)] 98 °F (36.7 °C)  Heart Rate:  [80-85] 80  Resp:  [16-18] 18  BP: (123-128)/(65-78) 128/78  SpO2:  [94 %-99 %] 95 %  on  Flow (L/min):  [2] 2;   Device (Oxygen Therapy): nasal cannula  Body mass index is 9.43 kg/m².    Wt Readings from Last 3 Encounters:   09/06/21 27.3 kg (60 lb 3.2 oz)   08/23/21 34 kg (75 lb)   10/01/20 36.3 kg (80 lb)     Intake & Output (last 3 days)       09/05 0701 - 09/06 0700 09/06 0701 - 09/07 0700 09/07 0701 - 09/08 0700    P.O.  0 480    I.V. (mL/kg)   297.6 (10.9)    IV Piggyback 1200      Total Intake(mL/kg) 1200 (35.3) 0 (0) 777.6 (28.5)    Urine (mL/kg/hr)  850 (1.3) 325 (1)    Stool  0     Total Output  850 325    Net +1200 -850 +452.6           Stool Unmeasured Occurrence  0 x         Diet Soft Texture; Ground; Thin  ----------------------------------------------------------------------------------------------------------------------  Physical exam:  Constitutional: Elderly frail cachectic thin female resting in bed, NAD   HENT:  Head:  Normocephalic and atraumatic.  Mouth:  Moist mucous membranes.    Eyes: Right eye surgically absent..    Neck:  Neck supple.  No JVD present.    Cardiovascular:  Normal rate, regular rhythm and normal heart sounds.  Pulmonary/Chest:  No respiratory distress, no wheezes, no crackles, with normal breath sounds and good air movement.  Abdominal:  Soft.  Bowel sounds are normal.  No distension and no tenderness.   Musculoskeletal:  No edema, no tenderness, and no deformity.  Diffuse muscle wasting of the extremities present..   Neurological:  Alert and and answers simple questions but unable to provide more complex  answers or mumble more than a few words.  No focal neurological deficits on exam though.    Skin:  Skin is warm and dry.   Peripheral vascular:  Pulses in all 4 extremities with no clubbing, no cyanosis, no edema.  Psychiatric: Appropriate mood and affect, pleasant.   ----------------------------------------------------------------------------------------------------------------------  Tele:    ----------------------------------------------------------------------------------------------------------------------  Results from last 7 days   Lab Units 09/07/21 0438 09/06/21 1542 09/06/21  0138 09/05/21 2311 09/05/21 2203   CRP mg/dL  --  0.45  --   --   --    LACTATE mmol/L  --   --  1.9  --   --    WBC 10*3/mm3 10.00 10.32  --   --  11.06*   HEMOGLOBIN g/dL 11.6* 13.1  --   --  13.0   HEMATOCRIT % 34.8 40.5  --   --  41.2   MCV fL 93.8 93.8  --   --  96.5   MCHC g/dL 33.3 32.3  --   --  31.6   PLATELETS 10*3/mm3 289 278  --   --  222   INR   --   --   --  0.97  --          Results from last 7 days   Lab Units 09/07/21 0438 09/06/21 1542 09/05/21 2203   SODIUM mmol/L 137 134* 136   POTASSIUM mmol/L 3.5 4.2 3.4*   MAGNESIUM mg/dL  --  2.1  --    CHLORIDE mmol/L 103 103 101   CO2 mmol/L 24.9 20.2* 23.9   BUN mg/dL 10 9 14   CREATININE mg/dL 0.33* 0.39* 0.59   EGFR IF NONAFRICN AM mL/min/1.73 >150 >150 101   CALCIUM mg/dL 8.2* 8.4* 8.3*   GLUCOSE mg/dL 56* 69 84   ALBUMIN g/dL  --  2.71* 3.07*   BILIRUBIN mg/dL  --  0.5 0.4   ALK PHOS U/L  --  191* 183*   AST (SGOT) U/L  --  85* 59*   ALT (SGPT) U/L  --  75* 65*   Estimated Creatinine Clearance: 28.6 mL/min (A) (by C-G formula based on SCr of 0.33 mg/dL (L)).  No results found for: AMMONIA  Results from last 7 days   Lab Units 09/06/21  1542 09/05/21 2203   TROPONIN T ng/mL <0.010 <0.010     Results from last 7 days   Lab Units 09/05/21 2203   PROBNP pg/mL 892.8         Glucose   Date/Time Value Ref Range Status   09/07/2021 1716 86 70 - 130 mg/dL Final      Comment:     Meter: [id number] : [id number] Osman GAMBOA   09/07/2021 1208 140 (H) 70 - 130 mg/dL Final     Comment:     Meter: [id number] : [id number] cassidy simmons   09/07/2021 1135 58 (L) 70 - 130 mg/dL Final     Comment:     Meter: [id number] : [id number] cassidy simmons   09/07/2021 0655 193 (H) 70 - 130 mg/dL Final     Comment:     Meter: [id number] : [id number] MELODY VILLELA   09/07/2021 0015 150 (H) 70 - 130 mg/dL Final     Comment:     Meter: [id number] : [id number] MAQBOOL AHMAD   09/06/2021 2327 51 (L) 70 - 130 mg/dL Final     Comment:     Meter: [id number] : [id number] morales mohsen   09/06/2021 1954 79 70 - 130 mg/dL Final     Comment:     Meter: [id number] : [id number] MAQBOOL AHMAD   09/06/2021 1728 139 (H) 70 - 130 mg/dL Final     Comment:     Meter: [id number] : [id number] Brittany Collett     Lab Results   Component Value Date    TSH 5.350 (H) 09/05/2021     No results found for: PREGTESTUR, PREGSERUM, HCG, HCGQUANT  Pain Management Panel    There is no flowsheet data to display.       Brief Urine Lab Results  (Last result in the past 365 days)      Color   Clarity   Blood   Leuk Est   Nitrite   Protein   CREAT   Urine HCG        09/05/21 2308 Yellow Clear Negative Negative Negative Negative             Blood Culture   Date Value Ref Range Status   09/06/2021 No growth at 24 hours  Preliminary   09/06/2021 No growth at 24 hours  Preliminary     No results found for: URINECX  No results found for: WOUNDCX  No results found for: STOOLCX  No results found for: RESPCX  No results found for: AFBCX  Results from last 7 days   Lab Units 09/06/21  1542 09/06/21  0138   LACTATE mmol/L  --  1.9   CRP mg/dL 0.45  --        I have personally looked at the labs and they are summarized above.  ----------------------------------------------------------------------------------------------------------------------  Detailed radiology reports for the last 24 hours:    Imaging  Results (Last 24 Hours)     Procedure Component Value Units Date/Time    FL Video Swallow Single Contrast [[id number]] Collected: 09/07/21 1048     Updated: 09/07/21 1105    Narrative:      FL VIDEO SWALLOW SINGLE-CONTRAST-     CLINICAL INDICATION: Aspiration; J18.9-Pneumonia, unspecified organism;  R55-Syncope and collapse.        TECHNIQUE:   Speech therapy service was present. The patient was examined in the  sitting lateral position and was given several consistencies of barium.     FINDINGS: Premature loss of all consistencies to bilateral pyriforms,  controlled. Deep laryngeal penetration, leading to silent aspiration  during the swallow with nectar thick and thin liquids, 2/2 only large  cup bolus. No aspiration via other presentation styles.      No other laryngeal  penetration or aspiration evidenced.            FLUOROSCOPY TIME: 2.1 minutes.     IMAGES: Cine loop was acquired.       Impression:      Aspiration with nectar thickened and thin liquids.     For additional information please see the report provided by the speech  therapy service.     This report was finalized on 9/7/2021 11:02 AM by Dr. Blue Coley MD.           Assessment & Plan      Multi-lobar pneumonia    -Patient currently oxygenating well on 2 L nasal cannula will titrate as tolerated    -Given risk for aspiration patient additionally on Flagyl in addition to Rocephin and doxycycline for empiric community-acquired coverage    -Respiratory PCR panel, strep pneumo antigen, Legionella antigen all negative    -We will attempt to stepdown to oral therapy as soon as able but will plan for initial 48 to 72 hours of IV antibiotics    Syncopal episode    -Patient with syncopal episode at home after patient's daughter found her leaned against a wall at the bathroom and that she then dropped her head and went limp.    -Continue continuous cardiac monitoring    -Patient with hypoglycemia this morning while n.p.o. certainly some component of that  could have been in play as patient's glucose was not initially checked as far as we can tell by EMS at time of initial transfer to the ER.  However noted patient's glucose was 84 at time of presentation to the ER and did get as low as 50 while being evaluated.    -TTE obtained shows EF of 61 to 65% with normal LV SF, unfortunately LV diastolic function was not assessed due to technically limited study.    Possible ileus    -Patient with CT of the abdomen pelvis that showed gas-filled small bowel loops suggesting a generalized ileus rather than bowel obstruction at time of her initial evaluation    -Patient has shown no tenderness to palpation, no complaints of abdominal pain and bowel sounds are active throughout    Mild bilateral hydronephrosis    -No obstructing lesions noted on CT on 9/5    -UA negative, renal function within normal limits, continue catheter for now we will attempt to remove and monitor urine output tomorrow.    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      VTE Prophylaxis:   Mechanical Order History:     None      Pharmalogical Order History:      Ordered     Dose Route Frequency Stop    09/06/21 1541  enoxaparin (LOVENOX) syringe 30 mg      30 mg SC Every 24 Hours --    09/06/21 1536  Pharmacy to Dose enoxaparin (LOVENOX)  Status:  Discontinued     Question:  Indication of use  Answer:  Prophylaxis    -- XX Continuous PRN 09/06/21 1541                Disposition patient previously at home with daughter however plan to transition to [facility] this past weekend and can be discharged there once medically stable as  long as bed still available.    Herman Navas DO  [facility] Hospitalist  21  19:29 EDT      Electronically signed by Herman Navas DO at 21       Consult Notes (most recent note)    No notes of this type exist for this encounter.         Nutrition Notes (most recent note)    No notes exist for this encounter.         Physical Therapy Notes (most recent note)    No notes exist for this encounter.         Occupational Therapy Notes (most recent note)    No notes exist for this encounter.            Speech Language Pathology Notes (most recent note)      Goldie Farr MS CCC-SLP at 21 1111          Acute Care - Speech Language Pathology   Swallow Initial Evaluation  [facility]   MODIFIED BARIUM SWALLOW STUDY     Patient Name: Radha Bacon  : 1950  MRN: [id number]  Today's Date: 2021             Admit Date: 2021    Radha Bacon  presents to the radiology suite this am from 3347/2S to participate in an instrumental MBS to evaluate safety/efficacy of swallowing fnx, determine safest/least restrictive diet.    Ms. Bacon was admitted 21 from her private residence 2/2 s/p syncopal episode. She presented w/ PNA, malnourishment w/ cachexia, COPD exacerbation. She is referred to r/o dysphagia, as she was reported w/ h/o recommended esophageal dilation, which was never performed.       Social History     Socioeconomic History   • Marital status:      Spouse name: Not on file   • Number of children: Not on file   • Years of education: Not on file   • Highest education level: Not on file   Tobacco Use   • Smoking status: Never Smoker   • Smokeless tobacco: Never Used      Imaging:   CT Chest WO     INDICATION:   Syncopal episode. Patient unresponsive.     TECHNIQUE:   CT of the thorax without IV contrast. Coronal and sagittal reconstructions were obtained.  Radiation dose reduction techniques included automated exposure control or exposure modulation  based on body size. Count of known CT and cardiac nuc med studies  performed in previous 12 months: 2.      COMPARISON:   11/14/2018     FINDINGS:  Exam is degraded by streak artifact from the patient's arms. Patient is cachectic. There does appear to be generalized anasarca. Patient has bilateral breast implants. The right side is ruptured and deflated. There is diffuse atherosclerotic disease and  coronary artery disease. No pleural or pericardial effusion is identified. There is no definite adenopathy. Lung windows show COPD. There are tree in bud infiltrates in the lower lobes, right middle lobe, and lingula suggesting an acute infection.  Imaging features are atypical or uncommonly reported for COVID-19 pneumonia. Alternative diagnoses should be considered. No dense alveolar consolidations are seen. There is some mucous plugging in right lower lobe bronchi.     IMPRESSION:     1. COPD with mild bilateral tree-in-bud-type pneumonia as above.  2. Atherosclerotic disease and coronary artery disease.  3. Cachexia with mild generalized anasarca.     Signer Name: Michel Mata MD   Signed: 9/6/2021 12:11 AM   Workstation Name: [email or web address]    [facility]     Labs:  09/07/21 0703  POC Glucose Once   Collected: 09/07/21 0655  Final result  Specimen: Blood    Glucose 193High  mg/dL             09/07/21 0533  Basic Metabolic Panel   Collected: 09/07/21 0438  Final result  Specimen: Blood    Glucose 56Low  mg/dL CO2 24.9 mmol/L   BUN 10 mg/dL Calcium 8.2Low  mg/dL   Creatinine 0.33Low  mg/dL eGFR Non African Amer >150 mL/min/1.73   Sodium 137 mmol/L BUN/Creatinine Ratio 30.3High    Potassium 3.5 mmol/L Anion Gap 9.1 mmol/L   Chloride 103 mmol/L            09/07/21 0508  CBC & Differential   Collected: 09/07/21 0438  Final result  Specimen: Blood           09/07/21 0508  CBC Auto Differential   Collected: 09/07/21 0438  Final result  Specimen: Blood    WBC 10.00 10*3/mm3 Lymphocyte % 7.6Low   %   RBC 3.71Low  10*6/mm3 Monocyte % 4.7Low  %   Hemoglobin 11.6Low  g/dL Eosinophil % 0.0Low  %   Hematocrit 34.8 % Basophil % 0.3 %   MCV 93.8 fL Immature Grans % 0.5 %   MCH 31.3 pg Neutrophils, Absolute 8.69High  10*3/mm3   MCHC 33.3 g/dL Lymphocytes, Absolute 0.76 10*3/mm3   RDW 14.2 % Monocytes, Absolute 0.47 10*3/mm3   RDW-SD 48.8 fl Eosinophils, Absolute 0.00 10*3/mm3   MPV 9.7 fL Basophils, Absolute 0.03 10*3/mm3   Platelets 289 10*3/mm3 Immature Grans, Absolute 0.05 10*3/mm3   Neutrophil % 86.9High  % nRBC 0.0 /100 WBC          09/07/21 0022  POC Glucose Once   Collected: 09/07/21 0015  Final result  Specimen: Blood    Glucose 150High  mg/dL         Diet Orders (active) (From admission, onward)     Start     Ordered    09/07/21 1109  Diet Soft Texture; Ground; Thin  Diet Effective Now     Comments: All liquids in lidded cup/straw only. No open cups, please.    09/07/21 1109              Observed on O2 via NC 2L.     Risks and benefits of the procedure are explained w/ verbalizing understanding/agreement to participate. Proceed per protocol.      She is positioned upright and centered in a soft strap supportive chair to accept multiple po presentations of solid cracker, puree, honey thick, nectar thick, and thin liquids via spoon, cup and straw, along w/ whole placebo pill in puree. Pt is able to self feed w/ standby assistance during this procedure, however, suspect she will benefit from 1:1 assistance during meals 2/2 ams.     All views are from the lateral plane.     Facial/oral structures are symmetrical upon observation w/o lingual deviation upon protrusion. Oral mucosa are moist, pink and clean. Secretions are clear, thin and well controlled. OROM/ASHLEY is wfl to imitate oral postures. Gag is not assessed. Volitional cough is adequate in intensity, clear in quality, nonproductive. Vocal quality is adequate in intensity, clear in quality w/ intelligible speech. Pt is a/a and cooperative to particpate.   Pt  is oriented to person, place, and time, follows simple directives, and participates in simple conversational exchanges.     Upon po presentations, adequate bolus anticipation w/ good labial seal for bolus clearance via spoon bowl, cup rim stability and suction via straw.  Bolus formation, manipulation,  and control are moderately weak in general w/ mixed phasic/ rotary mastication pattern. A-p transit is slightly delayed w/ piecemeal deglutition of solids. No significant oral residue. Tongue base retraction and linguavelar seal are weak w/ premature spillage to the bilateral pyriform sinuses, controlled. No laryngeal penetration or aspiration is evidenced before the swallow.     Pharyngeal swallow is timely w/ weak hyolaryngeal elevation and slightly delayed epiglottic inversion. Deep laryngeal penetration of nectar thick and thin liquids occurs during the swallow, leading to silent aspiration during the swallow of both. This occurs only w/ large, uncontrolled cup presentations. No laryngeal penetration or aspiration evidenced w/ small cup presentations, straw presentations. No other laryngeal penetration or aspiration evidenced during or after the swallow.     Full esophageal sweep reveals mild delay in esophageal peristalsis w/ all consistencies w/ retrograde flow to mid esophagus w/ delayed esophageal clearance. Please see full radiology report for details.       Visit Dx:     ICD-10-CM ICD-9-CM   1. Pneumonia due to infectious organism, unspecified laterality, unspecified part of lung  J18.9 486   2. Syncope, unspecified syncope type  R55 780.2     Patient Active Problem List   Diagnosis   • Pneumonia due to infectious organism     Past Medical History:   Diagnosis Date   • Dementia (CMS/HCC)      Past Surgical History:   Procedure Laterality Date   • AUGMENTATION MAMMAPLASTY Bilateral    • BREAST AUGMENTATION     •  SECTION     • EYE SURGERY Right     prostetic right eye   • GALLBLADDER  SURGERY     • HYSTERECTOMY     • TONSILLECTOMY       Impression: Ms. Bacon presents w/mild-moderate oropharyngeal swallow w/ deep laryngeal penetration of nectar thick and thin liquids occurs during the swallow, leading to silent aspiration during the swallow of both. This occurs only w/ large, uncontrolled cup presentations. No laryngeal penetration or aspiration evidenced w/ small cup presentations, straw presentations. No other laryngeal penetration or aspiration evidenced during or after the swallow.     Full esophageal sweep reveals mild delay in esophageal peristalsis w/ all consistencies w/ retrograde flow to mid esophagus w/ delayed esophageal clearance. Please see full radiology report for details.      Per this evaluation, Ms. Bacon is felt to most benefit from modified po diet of mechanical soft, ground meats, thin liquids. ALL liquids via lidded up or straw only. No open cups. She will benefit from 1:1 assistance w/all po intake 2/2 ams and generalized weakness/fatigue. This is felt to represent her least restrictive modified po diet.     EDUCATION  The patient has been educated in the following areas:   Dysphagia (Swallowing Impairment) Oral Care/Hydration Modified Diet Instruction.     SLP Recommendation and Plan    1. Mechanical soft diet, ground meats, thin liquids. ALL liquids via lidded cup or straw only. No open cups, please.  2. Medications crushed in puree/thins.    3. 1:1 assistance w/all po intake to encourage po acceptance/safety w/ po intake.   3. Universal aspiration precautions.   5. JORGE LUIS precautions.   6. Oral care protocol.   SLP to f/u for diet safety/acceptance    D/w Ms. Bacon results and recommendations w/ verbal understanding and agreement.     Communicated to Dr. Navas and RN results and recommendations w/ verbal understanding and agreement.     Ms. Bacon  was not wearing a face mask during this therapy encounter. Therapist used appropriate personal protective equipment including  mask, eye protection and gloves.  Mask used was standard procedure mask. Appropriate PPE was worn during the entire therapy session. The patient coughed during this evaluation. Therapist was within 6 feet for 15 minutes or more during the evaluation. Hand hygiene was completed before and after therapy session. Patient is not in enhanced droplet precautions.     Thank you for allowing me to participate in the care of your patient-  Goldie Farr M.S. DIDIER/SLP                                                                                  Time Calculation:   Time Calculation- SLP     Time Calculation- SLP     Row Name 09/07/21 1110      SLP Received On  09/07/21  -Recorded by: VILMA     SLP - Next Appointment  09/08/21  -Recorded by: VILMA             User Key     Initials Name Provider Type    Goldie Cam MS CCC-SLP Speech and Language Pathologist          Therapy Charges for Today     Code Description Service Date Service Provider Modifiers Qty    50066612710 HC ST MOTION FLUORO EVAL SWALLOW 8 9/7/2021 Goldie Farr MS CCC-SLP GN 1               MS JANIS Cardenas  9/7/2021    Electronically signed by Goldie Farr MS CCC-SLP at 09/07/21 1146       Respiratory Therapy Notes (most recent note)    No notes exist for this encounter.         ADL Documentation (most recent)      Most Recent Value   Transferring  2 - assistive person   Toileting  2 - assistive person   Bathing  2 - assistive person   Dressing  2 - assistive person   Eating  2 - assistive person   Communication  3 - unable to understand or speak (not related to language barrier)   Swallowing  2 - difficulty swallowing liquids/foods   Equipment Currently Used at Home  none

## 2021-09-08 NOTE — CASE MANAGEMENT/SOCIAL WORK
Discharge Planning Assessment   [place]     Patient Name: Radha Bacon  MRN: [id number]  Today's Date: 9/8/2021    Admit Date: 9/5/2021      Discharge Plan     Row Name 09/08/21 0934       Plan    Plan SS contacted Oriana Cohen who request clinicals. SS faxed referral to [facility]. SS to follow.    11:54 am: SS received message from Oriana Cohen stating they are working on a bed for pt. SS to follow.        Continued Care and Services - Admitted Since 9/5/2021     Destination     Service Provider Request Status Selected Services Address Phone Fax Patient Preferred    [facility]  Pending - No Request Sent N/A [address] [place] [id number] [phone] --              JOSE Rao

## 2021-09-08 NOTE — THERAPY RE-EVALUATION
Acute Care - Speech Language Pathology   Swallow Re-Assessment  [place]     Patient Name: Radha Bacon  : 1950  MRN: [id number]  Today's Date: 2021             Admit Date: 2021    Ms. Bacon was seen at bedside this am for diet safety/acceptance assessment. She is s/p instrumental MBS 21 w/ recommended modified po diet of mechanical soft, ground meats, thin liquids. Medications crushed in puree, 1/1 assistance w/ po intake. She was noted to require lidded cup/straw presentations of liquids only, 2/2 poor bolus size and rate control w/ open cup flow.     Today, Ms. Bacon is a/a, sitting upright and centered in bed. She is agreeable to accept po presentations of cracker, puree, and thin liquids via straw and lidded cup. She is able to hold the cup today for acceptance, however, she requires 1:1 assistance w/ spoon bolus.     No overt s/s aspiration evidenced. No silent aspiration suspected .She denies any odynophagia. Nursing staff reports intake of 75% of breakfast tray today. She continues to present w/ good appetite and desire for po intake.     Visit Dx:     ICD-10-CM ICD-9-CM   1. Pneumonia due to infectious organism, unspecified laterality, unspecified part of lung  J18.9 486   2. Syncope, unspecified syncope type  R55 780.2     Patient Active Problem List   Diagnosis   • Pneumonia due to infectious organism     Past Medical History:   Diagnosis Date   • Dementia (CMS/HCC)      Past Surgical History:   Procedure Laterality Date   • AUGMENTATION MAMMAPLASTY Bilateral    • BREAST AUGMENTATION     •  SECTION     • EYE SURGERY Right     prostetic right eye   • GALLBLADDER SURGERY     • HYSTERECTOMY     • TONSILLECTOMY       Impression: Ms. Bacon continues to safely accept what is felt to be representative of her least restrictive modified po diet. She is accepting po intake well w/ good appetite at this time. She continues to require 1:1 assistance w/po intake 2/2 dementia w/  ams, weakness. No further formal SLP f/u recommended.     SLP Recommendation and Plan     1. Mechanical soft diet, ground meats, thin liquids. ALL liquids via lidded cup or straw only. No open cups, please.  2. Medications crushed in puree/thins.    3. 1:1 assistance w/all po intake to encourage po acceptance/safety w/ po intake.   3. Universal aspiration precautions.   5. JORGE LUIS precautions.   6. Oral care protocol.     D/w Ms. Bacon results and recommendations w/ verbal understanding and agreement.      Communicated to RN results and recommendations w/ verbal understanding and agreement.      Ms. Bacon  was not wearing a face mask during this therapy encounter. Therapist used appropriate personal protective equipment including mask, eye protection and gloves.  Mask used was standard procedure mask. Appropriate PPE was worn during the entire therapy session. The patient coughed during this evaluation. Therapist was within 6 feet for 15 minutes or more during the evaluation. Hand hygiene was completed before and after therapy session. Patient is not in enhanced droplet precautions.      Thank you for allowing me to participate in the care of your patient-  Goldie Farr M.S. CCC/SLP                                                                        EDUCATION  The patient has been educated in the following areas:   Dysphagia (Swallowing Impairment) Oral Care/Hydration Modified Diet Instruction.              Time Calculation:       Therapy Charges for Today     Code Description Service Date Service Provider Modifiers Qty    25079079961 HC ST MOTION FLUORO EVAL SWALLOW 8 9/7/2021 Goldie Farr, MS CCC-SLP GN 1    [id number] HC ST EVAL ORAL PHARYNG SWALLOW 4 9/8/2021 Goldie Farr, MS CCC-SLP GN 1               Goldie Farr MS CCC-SLP  9/8/2021

## 2021-09-08 NOTE — PROGRESS NOTES
Malnutrition Severity Assessment      Patient meets criteria for : Severe Malnutrition     Malnutrition Type (last 8 hours)      Malnutrition Severity Assessment     Row Name 09/08/21 1307       Malnutrition Severity Assessment    Malnutrition Type  Chronic Disease - Related Malnutrition    Row Name 09/08/21 1307       Muscle Loss    Loss of Muscle Mass Findings  Severe    [place]gar Region  Severe - deep hollowing/scooping, lack of muscle to touch, facial bones well defined    Clavicle Bone Region  Severe - protruding prominent bone    Acromion Bone Region  Severe - squared shoulders, bones, and acromion process protrusion prominent    Scapular Bone Region  Severe - prominent bones, depressions easily visible between ribs, scapula, spine, shoulders    Dorsal Hand Region  Severe - prominent depression    Patellar Region  Severe - prominent bone, square looking, very little muscle definition    Anterior Thigh Region  Severe - line/depression along thigh, obviously thin    Posterior Calf Region  Severe - thin with very little definition/firmness    Row Name 09/08/21 1307       Fat Loss    Subcutaneous Fat Loss Findings  Severe    Orbital Region   Severe - pronounced hollowness/depression, dark circles, loose saggy skin    Upper Arm Region  Severe - mostly skin, very little space between folds, fingers touch    Thoracic & Lumbar Region  Severe - ribs visible with prominent depressions, iliac crest very prominent    Row Name 09/08/21 1307       Criteria Met (Must meet criteria for severity in at least 2 of these categories: M Wasting, Fat Loss, Fluid, Secondary Signs, Wt. Status, Intake)    Patient meets criteria for   Severe Malnutrition

## 2021-09-09 LAB
ANION GAP SERPL CALCULATED.3IONS-SCNC: 8.5 MMOL/L (ref 5–15)
BUN SERPL-MCNC: 21 MG/DL (ref 8–23)
BUN/CREAT SERPL: 38.2 (ref 7–25)
CALCIUM SPEC-SCNC: 8.6 MG/DL (ref 8.6–10.5)
CHLORIDE SERPL-SCNC: 100 MMOL/L (ref 98–107)
CO2 SERPL-SCNC: 28.5 MMOL/L (ref 22–29)
CREAT SERPL-MCNC: 0.55 MG/DL (ref 0.57–1)
DEPRECATED RDW RBC AUTO: 49.6 FL (ref 37–54)
ERYTHROCYTE [DISTWIDTH] IN BLOOD BY AUTOMATED COUNT: 14.4 % (ref 12.3–15.4)
GFR SERPL CREATININE-BSD FRML MDRD: 110 ML/MIN/1.73
GLUCOSE BLDC GLUCOMTR-MCNC: 104 MG/DL (ref 70–130)
GLUCOSE BLDC GLUCOMTR-MCNC: 131 MG/DL (ref 70–130)
GLUCOSE BLDC GLUCOMTR-MCNC: 41 MG/DL (ref 70–130)
GLUCOSE BLDC GLUCOMTR-MCNC: 64 MG/DL (ref 70–130)
GLUCOSE BLDC GLUCOMTR-MCNC: 93 MG/DL (ref 70–130)
GLUCOSE BLDC GLUCOMTR-MCNC: 93 MG/DL (ref 70–130)
GLUCOSE BLDC GLUCOMTR-MCNC: 94 MG/DL (ref 70–130)
GLUCOSE SERPL-MCNC: 85 MG/DL (ref 65–99)
HCT VFR BLD AUTO: 40.6 % (ref 34–46.6)
HGB BLD-MCNC: 13.4 G/DL (ref 12–15.9)
INR PPP: 1.01 (ref 0.9–1.1)
MAGNESIUM SERPL-MCNC: 1.7 MG/DL (ref 1.6–2.4)
MCH RBC QN AUTO: 31.1 PG (ref 26.6–33)
MCHC RBC AUTO-ENTMCNC: 33 G/DL (ref 31.5–35.7)
MCV RBC AUTO: 94.2 FL (ref 79–97)
PHOSPHATE SERPL-MCNC: 3 MG/DL (ref 2.5–4.5)
PLATELET # BLD AUTO: 339 10*3/MM3 (ref 140–450)
PMV BLD AUTO: 9.5 FL (ref 6–12)
POTASSIUM SERPL-SCNC: 3.7 MMOL/L (ref 3.5–5.2)
PROTHROMBIN TIME: 13.7 SECONDS (ref 12.8–14.5)
RBC # BLD AUTO: 4.31 10*6/MM3 (ref 3.77–5.28)
SODIUM SERPL-SCNC: 137 MMOL/L (ref 136–145)
WBC # BLD AUTO: 8.98 10*3/MM3 (ref 3.4–10.8)

## 2021-09-09 PROCEDURE — 83735 ASSAY OF MAGNESIUM: CPT | Performed by: STUDENT IN AN ORGANIZED HEALTH CARE EDUCATION/TRAINING PROGRAM

## 2021-09-09 PROCEDURE — 25010000002 ENOXAPARIN PER 10 MG: Performed by: STUDENT IN AN ORGANIZED HEALTH CARE EDUCATION/TRAINING PROGRAM

## 2021-09-09 PROCEDURE — 83525 ASSAY OF INSULIN: CPT | Performed by: STUDENT IN AN ORGANIZED HEALTH CARE EDUCATION/TRAINING PROGRAM

## 2021-09-09 PROCEDURE — 80048 BASIC METABOLIC PNL TOTAL CA: CPT | Performed by: STUDENT IN AN ORGANIZED HEALTH CARE EDUCATION/TRAINING PROGRAM

## 2021-09-09 PROCEDURE — 85610 PROTHROMBIN TIME: CPT | Performed by: STUDENT IN AN ORGANIZED HEALTH CARE EDUCATION/TRAINING PROGRAM

## 2021-09-09 PROCEDURE — 84681 ASSAY OF C-PEPTIDE: CPT | Performed by: STUDENT IN AN ORGANIZED HEALTH CARE EDUCATION/TRAINING PROGRAM

## 2021-09-09 PROCEDURE — 85027 COMPLETE CBC AUTOMATED: CPT | Performed by: STUDENT IN AN ORGANIZED HEALTH CARE EDUCATION/TRAINING PROGRAM

## 2021-09-09 PROCEDURE — 84206 ASSAY OF PROINSULIN: CPT | Performed by: STUDENT IN AN ORGANIZED HEALTH CARE EDUCATION/TRAINING PROGRAM

## 2021-09-09 PROCEDURE — 82962 GLUCOSE BLOOD TEST: CPT

## 2021-09-09 PROCEDURE — 25010000002 CEFTRIAXONE PER 250 MG: Performed by: STUDENT IN AN ORGANIZED HEALTH CARE EDUCATION/TRAINING PROGRAM

## 2021-09-09 PROCEDURE — 84100 ASSAY OF PHOSPHORUS: CPT | Performed by: STUDENT IN AN ORGANIZED HEALTH CARE EDUCATION/TRAINING PROGRAM

## 2021-09-09 PROCEDURE — 82010 KETONE BODYS QUAN: CPT | Performed by: STUDENT IN AN ORGANIZED HEALTH CARE EDUCATION/TRAINING PROGRAM

## 2021-09-09 PROCEDURE — 99232 SBSQ HOSP IP/OBS MODERATE 35: CPT | Performed by: STUDENT IN AN ORGANIZED HEALTH CARE EDUCATION/TRAINING PROGRAM

## 2021-09-09 RX ADMIN — SODIUM CHLORIDE, PRESERVATIVE FREE 10 ML: 5 INJECTION INTRAVENOUS at 09:55

## 2021-09-09 RX ADMIN — DOXYCYCLINE 100 MG: 100 INJECTION, POWDER, LYOPHILIZED, FOR SOLUTION INTRAVENOUS at 23:08

## 2021-09-09 RX ADMIN — SODIUM CHLORIDE, PRESERVATIVE FREE 10 ML: 5 INJECTION INTRAVENOUS at 20:13

## 2021-09-09 RX ADMIN — ENOXAPARIN SODIUM 30 MG: 30 INJECTION SUBCUTANEOUS at 17:07

## 2021-09-09 RX ADMIN — CEFTRIAXONE 1 G: 1 INJECTION, POWDER, FOR SOLUTION INTRAMUSCULAR; INTRAVENOUS at 23:08

## 2021-09-09 RX ADMIN — METRONIDAZOLE 500 MG: 500 INJECTION, SOLUTION INTRAVENOUS at 09:34

## 2021-09-09 RX ADMIN — POLYETHYLENE GLYCOL (3350) 17 G: 17 POWDER, FOR SOLUTION ORAL at 09:34

## 2021-09-09 RX ADMIN — METRONIDAZOLE 500 MG: 500 INJECTION, SOLUTION INTRAVENOUS at 01:34

## 2021-09-09 RX ADMIN — DOXYCYCLINE 100 MG: 100 INJECTION, POWDER, LYOPHILIZED, FOR SOLUTION INTRAVENOUS at 12:16

## 2021-09-09 RX ADMIN — METRONIDAZOLE 500 MG: 500 INJECTION, SOLUTION INTRAVENOUS at 16:53

## 2021-09-09 RX ADMIN — METRONIDAZOLE 500 MG: 500 INJECTION, SOLUTION INTRAVENOUS at 23:49

## 2021-09-09 NOTE — NURSING NOTE
Patient's Finger Stick blood glucose level at 2345 was 41. I called and notified BAKARI Pearce. Caroline ordered the requested labs to be drawn per Dr. Thompson before D-50 was given to help increase blood glucose levels. I did not need to give D-50 at this time since pt was awake and alert enough to eat on her own. Pt was given orange juice and a protein shake to help increase her glucose. At 0330 pt's FSBG was 104.

## 2021-09-09 NOTE — CASE MANAGEMENT/SOCIAL WORK
Discharge Planning Assessment   [place]     Patient Name: Radha Bacon  MRN: [id number]  Today's Date: 9/9/2021    Admit Date: 9/5/2021      Discharge Plan     Row Name 09/09/21 1146       Plan    Plan SS discussed pt with Oriana Perdue per Vicki and she request to be notified when pt is medically stable. Oriana Perdue to inform SS of bed status when pt is medically stable. SS to follow.        Continued Care and Services - Admitted Since 9/5/2021     Destination     Service Provider Request Status Selected Services Address Phone Fax Patient Preferred    [facility]  Pending - No Request Sent N/A [address] [facility] [address] [phone] --              JOSE Rao

## 2021-09-09 NOTE — PROGRESS NOTES
[facility] HOSPITALIST PROGRESS NOTE     Patient Identification:  Name:  Radha Bacon  Age:  71 y.o.  Sex:  female  :  1950  MRN:  [id number]  Visit Number:  [id number]  ROOM: [address] [place] [address]     Primary Care Provider:  Noemi Tracy APRN    Length of stay in inpatient status:  2    Subjective     Chief Compliant:    Chief Complaint   Patient presents with   • Syncope       History of Presenting Illness: Seen and evaluated in follow-up for community-acquired pneumonia and syncopal episode.  Patient today without any acute complaints currently oxygenating well on room air.  Patient recurrent hypoglycemia this morning, significant despite having continued excellent oral intake. Patient currently not on any antihyperglycemics.    Objective     Current Hospital Meds:cefTRIAXone, 1 g, Intravenous, Q24H  doxycycline, 100 mg, Intravenous, Q12H  enoxaparin, 30 mg, Subcutaneous, Q24H  metroNIDAZOLE, 500 mg, Intravenous, Q8H  polyethylene glycol, 17 g, Oral, Daily  sodium chloride, 10 mL, Intravenous, Q12H         Current Antimicrobial Therapy:  Anti-Infectives (From admission, onward)    Ordered     Dose/Rate Route Frequency Start Stop    21 1536  cefTRIAXone (ROCEPHIN) 1 g in sodium chloride 0.9 % 100 mL IVPB-VTB     Ordering Provider: Herman Navas DO    1 g  200 mL/hr over 30 Minutes Intravenous Every 24 Hours 21 0000 21 2359    21 1536  doxycycline (VIBRAMYCIN) 100 mg in sodium chloride 0.9 % 100 mL IVPB-VTB     Ordering Provider: Herman Navas DO    100 mg  over 60 Minutes Intravenous Every 12 Hours 21 0000 21 2359    21 1627  metroNIDAZOLE (FLAGYL) 500 mg/100mL IVPB     Ordering Provider: Laura Francois APRN    500 mg Intravenous Every 8 Hours 21 1715 21 1714    21 0058  cefTRIAXone (ROCEPHIN) 1 g in sodium chloride 0.9 % 100 mL IVPB-VTB     Ordering Provider: Cornell Berman MD    1 g  200 mL/hr over 30 Minutes Intravenous  Once 09/06/21 0100 09/06/21 0234    09/06/21 0058  doxycycline (VIBRAMYCIN) 100 mg in sodium chloride 0.9 % 100 mL IVPB-VTB     Ordering Provider: Cornell Berman MD    100 mg  over 60 Minutes Intravenous Once 09/06/21 0100 09/06/21 0234        Current Diuretic Therapy:  Diuretics (From admission, onward)    None        ----------------------------------------------------------------------------------------------------------------------  Vital Signs:  Temp:  [96.1 °F (35.6 °C)-97.2 °F (36.2 °C)] 97.2 °F (36.2 °C)  Heart Rate:  [69-71] 69  Resp:  [16] 16  BP: (138-144)/(80-85) 138/80  SpO2:  [95 %-99 %] 95 %  on  Flow (L/min):  [2] 2;   Device (Oxygen Therapy): nasal cannula  Body mass index is 9.43 kg/m².    Wt Readings from Last 3 Encounters:   09/06/21 27.3 kg (60 lb 3.2 oz)   08/23/21 34 kg (75 lb)   10/01/20 36.3 kg (80 lb)     Intake & Output (last 3 days)       09/06 0701 - 09/07 0700 09/07 0701 - 09/08 0700 09/08 0701 - 09/09 0700    P.O. 0 600 600    I.V. (mL/kg)  297.6 (10.9)     IV Piggyback       Total Intake(mL/kg) 0 (0) 897.6 (32.9) 600 (22)    Urine (mL/kg/hr) 850 (1.3) 875 (1.3) 1250 (3.5)    Stool 0 0     Total Output     Net -850 +22.6 -650           Stool Unmeasured Occurrence 0 x 1 x         Diet Soft Texture; Ground; Thin  ----------------------------------------------------------------------------------------------------------------------  Physical exam:  Constitutional: Elderly frail cachectic thin female resting in bed, NAD   HENT:  Head:  Normocephalic and atraumatic.  Mouth:  Moist mucous membranes.    Eyes: Right eye surgically absent..    Neck:  Neck supple.  No JVD present.    Cardiovascular:  Normal rate, regular rhythm and normal heart sounds.  Pulmonary/Chest:  No respiratory distress, no wheezes, no crackles, with normal breath sounds and good air movement.  Abdominal:  Soft.  Bowel sounds are normal.  No distension and no tenderness.   Musculoskeletal:  No edema, no  tenderness, and no deformity.  Diffuse muscle wasting of the extremities present..   Neurological:  Alert and and answers simple questions but unable to provide more complex answers or mumble more than a few words.  No focal neurological deficits on exam though.    Skin:  Skin is warm and dry.   Peripheral vascular:  Pulses in all 4 extremities with no clubbing, no cyanosis, no edema.  Psychiatric: Appropriate mood and affect, pleasant.   ----------------------------------------------------------------------------------------------------------------------  Tele:    ----------------------------------------------------------------------------------------------------------------------  Results from last 7 days   Lab Units 09/08/21  0518 09/07/21  0438 09/06/21  1542 09/06/21  0138 09/05/21  2311 09/05/21  2203   CRP mg/dL  --   --  0.45  --   --   --    LACTATE mmol/L  --   --   --  1.9  --   --    WBC 10*3/mm3 8.85 10.00 10.32  --   --    < >   HEMOGLOBIN g/dL 12.9 11.6* 13.1  --   --    < >   HEMATOCRIT % 40.8 34.8 40.5  --   --    < >   MCV fL 96.7 93.8 93.8  --   --    < >   MCHC g/dL 31.6 33.3 32.3  --   --    < >   PLATELETS 10*3/mm3 306 289 278  --   --    < >   INR   --   --   --   --  0.97  --     < > = values in this interval not displayed.         Results from last 7 days   Lab Units 09/08/21  0518 09/07/21  0438 09/06/21  1542 09/05/21 2203 09/05/21 2203   SODIUM mmol/L 134* 137 134*   < > 136   POTASSIUM mmol/L 3.3* 3.5 4.2   < > 3.4*   MAGNESIUM mg/dL  --   --  2.1  --   --    CHLORIDE mmol/L 98 103 103   < > 101   CO2 mmol/L 26.2 24.9 20.2*   < > 23.9   BUN mg/dL 12 10 9   < > 14   CREATININE mg/dL 0.54* 0.33* 0.39*   < > 0.59   EGFR IF NONAFRICN AM mL/min/1.73 112 >150 >150   < > 101   CALCIUM mg/dL 8.5* 8.2* 8.4*   < > 8.3*   GLUCOSE mg/dL 67 56* 69   < > 84   ALBUMIN g/dL 3.10*  --  2.71*  --  3.07*   BILIRUBIN mg/dL 0.3  --  0.5  --  0.4   ALK PHOS U/L 176*  --  191*  --  183*   AST (SGOT) U/L 25   --  85*  --  59*   ALT (SGPT) U/L 46*  --  75*  --  65*    < > = values in this interval not displayed.   Estimated Creatinine Clearance: 28.6 mL/min (A) (by C-G formula based on SCr of 0.54 mg/dL (L)).  No results found for: AMMONIA  Results from last 7 days   Lab Units 09/06/21  1542 09/05/21  2203   TROPONIN T ng/mL <0.010 <0.010     Results from last 7 days   Lab Units 09/05/21  2203   PROBNP pg/mL 892.8         Glucose   Date/Time Value Ref Range Status   09/08/2021 1542 99 70 - 130 mg/dL Final     Comment:     Meter: [id number] : [id number] MIMI CAVANAUGH   09/08/2021 1128 180 (H) 70 - 130 mg/dL Final     Comment:     Meter: [id number] : [id number] MIMI CAVANAUGH   09/08/2021 1115 89 70 - 130 mg/dL Final     Comment:     Meter: [id number] : [id number] Antonella Sofia   09/08/2021 1104 17 (C) 70 - 130 mg/dL Final     Comment:     Treated Patient RN Notified Meter: [id number] : [id number] Marci Arvizu   09/08/2021 1059 25 (C) 70 - 130 mg/dL Final     Comment:     Critical repeat  Meter: [id number] : [id number] Marci Arvizu   09/08/2021 0627 59 (L) 70 - 130 mg/dL Final     Comment:     Meter: [id number] : [id number] BELTRAN PATRICK   09/08/2021 0032 71 70 - 130 mg/dL Final     Comment:     Meter: [id number] : [id number] kishan kaiser   09/07/2021 2353 53 (L) 70 - 130 mg/dL Final     Comment:     Meter: [id number] : [id number] kishan kaiser     Lab Results   Component Value Date    TSH 5.350 (H) 09/05/2021     No results found for: PREGTESTUR, PREGSERUM, HCG, HCGQUANT  Pain Management Panel    There is no flowsheet data to display.       Brief Urine Lab Results  (Last result in the past 365 days)      Color   Clarity   Blood   Leuk Est   Nitrite   Protein   CREAT   Urine HCG        09/05/21 2308 Yellow Clear Negative Negative Negative Negative             Blood Culture   Date Value Ref Range Status   09/06/2021 No growth at 24 hours  Preliminary   09/06/2021 No growth at 24 hours   Preliminary     No results found for: URINECX  No results found for: WOUNDCX  No results found for: STOOLCX  No results found for: RESPCX  No results found for: AFBCX  Results from last 7 days   Lab Units 09/06/21  1542 09/06/21  0138   LACTATE mmol/L  --  1.9   CRP mg/dL 0.45  --        I have personally looked at the labs and they are summarized above.  ----------------------------------------------------------------------------------------------------------------------  Detailed radiology reports for the last 24 hours:    Imaging Results (Last 24 Hours)     ** No results found for the last 24 hours. **        Assessment & Plan      Multi-lobar pneumonia    -Patient successfully titrated down to room air today.    -Given risk for aspiration patient additionally on Flagyl in addition to Rocephin and doxycycline for empiric community-acquired coverage    -Respiratory PCR panel, strep pneumo antigen, Legionella antigen all negative    -We will plan to initiate on oral therapy tomorrow.    Syncopal episode  Persistent recurrent hypoglycemia    -Patient with syncopal episode at home after patient's daughter found her leaned against a wall at the bathroom and that she then dropped her head and went limp.    -Continue continuous cardiac monitoring    -Patient with recurrent hypoglycemia again today requiring administration of D50. Patient is currently not receiving any insulin or other diabetic medications and has been in the hospital for approximately 48 hours at this point. She is also not on any diabetic medications at home. This raises concern for possible endogenous insulin production or other possible autoimmune versus malignant process. As such we will obtain insulin level, C-peptide, beta hydroxybutyrate, proinsulin levels while patient with glucose level of 50 or less to evaluate further.    -TTE obtained shows EF of 61 to 65% with normal LV SF, unfortunately LV diastolic function was not assessed due to  technically limited study.    Possible ileus    -Patient with CT of the abdomen pelvis that showed gas-filled small bowel loops suggesting a generalized ileus rather than bowel obstruction at time of her initial evaluation    -Patient has shown no tenderness to palpation, no complaints of abdominal pain and bowel sounds are active throughout    Mild bilateral hydronephrosis    -No obstructing lesions noted on CT on 9/5    -UA negative, renal function within normal limits, continue catheter for now, patient up ambulating today, will remove tomorrow.    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      VTE Prophylaxis:   Mechanical Order History:     None      Pharmalogical Order History:      Ordered     Dose Route Frequency Stop    09/06/21 1541  enoxaparin (LOVENOX) syringe 30 mg      30 mg SC Every 24 Hours --    09/06/21 1536  Pharmacy to Dose enoxaparin (LOVENOX)  Status:  Discontinued     Question:  Indication of use  Answer:  Prophylaxis    -- XX Continuous PRN 09/06/21 1541                Disposition patient previously at home with daughter however plan to transition to [facility] this past weekend and can be discharged there once medically stable as long as bed still available.    Herman Navas DO  [facility] Hospitalist  09/08/21  20:05 EDT

## 2021-09-10 VITALS
WEIGHT: 60.2 LBS | RESPIRATION RATE: 14 BRPM | TEMPERATURE: 98.3 F | HEART RATE: 93 BPM | OXYGEN SATURATION: 97 % | HEIGHT: 67 IN | SYSTOLIC BLOOD PRESSURE: 117 MMHG | BODY MASS INDEX: 9.45 KG/M2 | DIASTOLIC BLOOD PRESSURE: 62 MMHG

## 2021-09-10 LAB
ANION GAP SERPL CALCULATED.3IONS-SCNC: 7.5 MMOL/L (ref 5–15)
BUN SERPL-MCNC: 16 MG/DL (ref 8–23)
BUN/CREAT SERPL: 29.6 (ref 7–25)
C PEPTIDE SERPL-MCNC: 2.9 NG/ML (ref 1.1–4.4)
CALCIUM SPEC-SCNC: 8.2 MG/DL (ref 8.6–10.5)
CHLORIDE SERPL-SCNC: 103 MMOL/L (ref 98–107)
CO2 SERPL-SCNC: 26.5 MMOL/L (ref 22–29)
CREAT SERPL-MCNC: 0.54 MG/DL (ref 0.57–1)
GFR SERPL CREATININE-BSD FRML MDRD: 112 ML/MIN/1.73
GLUCOSE BLDC GLUCOMTR-MCNC: 131 MG/DL (ref 70–130)
GLUCOSE BLDC GLUCOMTR-MCNC: 140 MG/DL (ref 70–130)
GLUCOSE BLDC GLUCOMTR-MCNC: 72 MG/DL (ref 70–130)
GLUCOSE BLDC GLUCOMTR-MCNC: 75 MG/DL (ref 70–130)
GLUCOSE BLDC GLUCOMTR-MCNC: 86 MG/DL (ref 70–130)
GLUCOSE SERPL-MCNC: 78 MG/DL (ref 65–99)
POTASSIUM SERPL-SCNC: 3.8 MMOL/L (ref 3.5–5.2)
SODIUM SERPL-SCNC: 137 MMOL/L (ref 136–145)

## 2021-09-10 PROCEDURE — 94799 UNLISTED PULMONARY SVC/PX: CPT

## 2021-09-10 PROCEDURE — 25010000002 ENOXAPARIN PER 10 MG: Performed by: STUDENT IN AN ORGANIZED HEALTH CARE EDUCATION/TRAINING PROGRAM

## 2021-09-10 PROCEDURE — 82962 GLUCOSE BLOOD TEST: CPT

## 2021-09-10 PROCEDURE — 80048 BASIC METABOLIC PNL TOTAL CA: CPT | Performed by: STUDENT IN AN ORGANIZED HEALTH CARE EDUCATION/TRAINING PROGRAM

## 2021-09-10 PROCEDURE — 99239 HOSP IP/OBS DSCHRG MGMT >30: CPT | Performed by: STUDENT IN AN ORGANIZED HEALTH CARE EDUCATION/TRAINING PROGRAM

## 2021-09-10 RX ORDER — DOXYCYCLINE 100 MG/1
100 CAPSULE ORAL EVERY 12 HOURS SCHEDULED
Qty: 5 CAPSULE | Refills: 0 | Status: SHIPPED | OUTPATIENT
Start: 2021-09-10 | End: 2021-09-13

## 2021-09-10 RX ORDER — DOXYCYCLINE 100 MG/1
100 CAPSULE ORAL EVERY 12 HOURS SCHEDULED
Status: DISCONTINUED | OUTPATIENT
Start: 2021-09-10 | End: 2021-09-10 | Stop reason: HOSPADM

## 2021-09-10 RX ORDER — CEFDINIR 300 MG/1
300 CAPSULE ORAL EVERY 12 HOURS SCHEDULED
Qty: 5 CAPSULE | Refills: 0 | Status: SHIPPED | OUTPATIENT
Start: 2021-09-10 | End: 2021-09-13

## 2021-09-10 RX ORDER — CEFDINIR 300 MG/1
300 CAPSULE ORAL EVERY 12 HOURS SCHEDULED
Status: DISCONTINUED | OUTPATIENT
Start: 2021-09-10 | End: 2021-09-10 | Stop reason: HOSPADM

## 2021-09-10 RX ADMIN — ENOXAPARIN SODIUM 30 MG: 30 INJECTION SUBCUTANEOUS at 20:11

## 2021-09-10 RX ADMIN — POLYETHYLENE GLYCOL (3350) 17 G: 17 POWDER, FOR SOLUTION ORAL at 08:30

## 2021-09-10 RX ADMIN — DOXYCYCLINE 100 MG: 100 CAPSULE ORAL at 12:20

## 2021-09-10 RX ADMIN — METRONIDAZOLE 500 MG: 500 INJECTION, SOLUTION INTRAVENOUS at 08:29

## 2021-09-10 RX ADMIN — DOXYCYCLINE 100 MG: 100 CAPSULE ORAL at 20:11

## 2021-09-10 RX ADMIN — CEFDINIR 300 MG: 300 CAPSULE ORAL at 20:12

## 2021-09-10 RX ADMIN — SODIUM CHLORIDE, PRESERVATIVE FREE 10 ML: 5 INJECTION INTRAVENOUS at 20:12

## 2021-09-10 RX ADMIN — SODIUM CHLORIDE, PRESERVATIVE FREE 10 ML: 5 INJECTION INTRAVENOUS at 12:21

## 2021-09-10 RX ADMIN — CEFDINIR 300 MG: 300 CAPSULE ORAL at 14:01

## 2021-09-10 RX ADMIN — SODIUM CHLORIDE, PRESERVATIVE FREE 10 ML: 5 INJECTION INTRAVENOUS at 08:29

## 2021-09-10 NOTE — DISCHARGE PLACEMENT REQUEST
"Radha Bacon (71 y.o. Female)     Date of Birth Social Security Number Address Home Phone MRN    1950  [address] [address] [phone] [id number]    Zoroastrian Marital Status          Islam        Admission Date Admission Type Admitting Provider Attending Provider Department, Room/Bed    9/5/21 Emergency Herman Navas DO Cagle, William, DO [address], 3347/2S    Discharge Date Discharge Disposition Discharge Destination         Skilled Nursing Facility (DC - External)              Attending Provider: Herman Navas DO    Allergies: No Known Allergies    Isolation: None   Infection: None   Code Status: No CPR    Ht: 170.2 cm (67\")   Wt: 27.3 kg (60 lb 3.2 oz)    Admission Cmt: None   Principal Problem: None                Active Insurance as of 9/5/2021     Primary Coverage     Payor Plan Insurance Group Employer/Plan Group    MEDICARE MEDICARE A & B      Payor Plan Address Payor Plan Phone Number Payor Plan Fax Number Effective Dates    [address] [phone]  3/1/2015 - None Entered    [facility] [place] [address]       Subscriber Name Subscriber Birth Date Member ID       RADHA BACON 1950 [id number]           Secondary Coverage     Payor Plan Insurance Group Employer/Plan Group     FOR LIFE  FOR LIFE  SUP       Payor Plan Address Payor Plan Phone Number Payor Plan Fax Number Effective Dates    [address] [phone]  1/15/2018 - None Entered    [facility] [id number]       Subscriber Name Subscriber Birth Date Member ID       RADHA BACON 1950 [id number]                 Emergency Contacts      (Rel.) Home Phone Work Phone Mobile Phone    Vangie Chu (Daughter) -- -- [phone]            Lab Results (most recent)     Procedure Component Value Units Date/Time    POC Glucose Once [[id number]]  (Abnormal) Collected: 09/10/21 1517    Specimen: Blood Updated: 09/10/21 1525     Glucose 131 mg/dL      Comment: " Meter: [id number] : [id number] MIMI CAVANAUGH       C-Peptide [[id number]] Collected: 09/09/21 0127    Specimen: Blood Updated: 09/10/21 1310     C-Peptide 2.9 ng/mL      Comment: C-Peptide reference interval is for fasting patients.       Narrative:      Performed at:  [facility]  [id number]  : Keshawn Malik PhD, Phone:  [phone]    POC Glucose Once [[id number]]  (Normal) Collected: 09/10/21 1124    Specimen: Blood Updated: 09/10/21 1131     Glucose 86 mg/dL      Comment: Meter: [id number] : [id number] YVETTE PACE       Basic Metabolic Panel [[id number]]  (Abnormal) Collected: 09/10/21 0533    Specimen: Blood Updated: 09/10/21 0649     Glucose 78 mg/dL      BUN 16 mg/dL      Creatinine 0.54 mg/dL      Sodium 137 mmol/L      Potassium 3.8 mmol/L      Chloride 103 mmol/L      CO2 26.5 mmol/L      Calcium 8.2 mg/dL      eGFR Non African Amer 112 mL/min/1.73      BUN/Creatinine Ratio 29.6     Anion Gap 7.5 mmol/L     Narrative:      GFR Normal >60  Chronic Kidney Disease <60  Kidney Failure <15      Blood Culture - Blood, Arm, Right [[id number]] Collected: 09/06/21 0130    Specimen: Blood from Arm, Right Updated: 09/10/21 0145     Blood Culture No growth at 4 days    Blood Culture - Blood, Arm, Left [[id number]] Collected: 09/06/21 0138    Specimen: Blood from Arm, Left Updated: 09/10/21 0145     Blood Culture No growth at 4 days    Basic Metabolic Panel [[id number]]  (Abnormal) Collected: 09/09/21 0127    Specimen: Blood Updated: 09/09/21 0208     Glucose 85 mg/dL      BUN 21 mg/dL      Creatinine 0.55 mg/dL      Sodium 137 mmol/L      Potassium 3.7 mmol/L      Comment: Slight hemolysis detected by analyzer. Results may be affected.        Chloride 100 mmol/L      CO2 28.5 mmol/L      Calcium 8.6 mg/dL      eGFR Non African Amer 110 mL/min/1.73      BUN/Creatinine Ratio 38.2     Anion Gap 8.5 mmol/L     Narrative:      GFR Normal >60  Chronic Kidney Disease  <60  Kidney Failure <15      Phosphorus [[id number]]  (Normal) Collected: 09/09/21 0127    Specimen: Blood Updated: 09/09/21 0205     Phosphorus 3.0 mg/dL     Magnesium [[id number]]  (Normal) Collected: 09/09/21 0127    Specimen: Blood Updated: 09/09/21 0205     Magnesium 1.7 mg/dL     Protime-INR [[id number]]  (Normal) Collected: 09/09/21 0127    Specimen: Blood Updated: 09/09/21 0157     Protime 13.7 Seconds      INR 1.01    Narrative:      Suggested INR therapeutic range for stable oral anticoagulant therapy:    Low Intensity therapy:   1.5-2.0  Moderate Intensity therapy:   2.0-3.0  High Intensity therapy:   2.5-4.0    CBC (No Diff) [[id number]]  (Normal) Collected: 09/09/21 0127    Specimen: Blood Updated: 09/09/21 0133     WBC 8.98 10*3/mm3      RBC 4.31 10*6/mm3      Hemoglobin 13.4 g/dL      Hematocrit 40.6 %      MCV 94.2 fL      MCH 31.1 pg      MCHC 33.0 g/dL      RDW 14.4 %      RDW-SD 49.6 fl      MPV 9.5 fL      Platelets 339 10*3/mm3     Beta-Hydroxybutyrate [[id number]] Collected: 09/09/21 0127    Specimen: Blood Updated: 09/09/21 0130    Insulin, Random [[id number]] Collected: 09/09/21 0127    Specimen: Blood Updated: 09/09/21 0130    Proinsulin [[id number]] Collected: 09/09/21 0127    Specimen: Blood Updated: 09/09/21 0130    Comprehensive Metabolic Panel [[id number]]  (Abnormal) Collected: 09/08/21 0518    Specimen: Blood Updated: 09/08/21 0617     Glucose 67 mg/dL      BUN 12 mg/dL      Creatinine 0.54 mg/dL      Sodium 134 mmol/L      Potassium 3.3 mmol/L      Chloride 98 mmol/L      CO2 26.2 mmol/L      Calcium 8.5 mg/dL      Total Protein 5.7 g/dL      Albumin 3.10 g/dL      ALT (SGPT) 46 U/L      AST (SGOT) 25 U/L      Alkaline Phosphatase 176 U/L      Total Bilirubin 0.3 mg/dL      eGFR Non African Amer 112 mL/min/1.73      Globulin 2.6 gm/dL      A/G Ratio 1.2 g/dL      BUN/Creatinine Ratio 22.2     Anion Gap 9.8 mmol/L     Narrative:      GFR Normal >60  Chronic Kidney Disease <60  Kidney Failure  <15      CBC (No Diff) [[id number]]  (Normal) Collected: 09/08/21 0518    Specimen: Blood Updated: 09/08/21 0551     WBC 8.85 10*3/mm3      RBC 4.22 10*6/mm3      Hemoglobin 12.9 g/dL      Hematocrit 40.8 %      MCV 96.7 fL      MCH 30.6 pg      MCHC 31.6 g/dL      RDW 14.5 %      RDW-SD 51.1 fl      MPV 9.7 fL      Platelets 306 10*3/mm3     S. Pneumo Ag Urine or CSF - Urine, Urine, Clean Catch [[id number]]  (Normal) Collected: 09/06/21 1852    Specimen: Urine, Clean Catch Updated: 09/07/21 1635     Strep Pneumo Ag Negative    CBC & Differential [[id number]]  (Abnormal) Collected: 09/07/21 0438    Specimen: Blood Updated: 09/07/21 0508    Narrative:      The following orders were created for panel order CBC & Differential.  Procedure                               Abnormality         Status                     ---------                               -----------         ------                     CBC Auto Differential[[id number]]        Abnormal            Final result                 Please view results for these tests on the individual orders.    CBC Auto Differential [[id number]]  (Abnormal) Collected: 09/07/21 0438    Specimen: Blood Updated: 09/07/21 0508     WBC 10.00 10*3/mm3      RBC 3.71 10*6/mm3      Hemoglobin 11.6 g/dL      Hematocrit 34.8 %      MCV 93.8 fL      MCH 31.3 pg      MCHC 33.3 g/dL      RDW 14.2 %      RDW-SD 48.8 fl      MPV 9.7 fL      Platelets 289 10*3/mm3      Neutrophil % 86.9 %      Lymphocyte % 7.6 %      Monocyte % 4.7 %      Eosinophil % 0.0 %      Basophil % 0.3 %      Immature Grans % 0.5 %      Neutrophils, Absolute 8.69 10*3/mm3      Lymphocytes, Absolute 0.76 10*3/mm3      Monocytes, Absolute 0.47 10*3/mm3      Eosinophils, Absolute 0.00 10*3/mm3      Basophils, Absolute 0.03 10*3/mm3      Immature Grans, Absolute 0.05 10*3/mm3      nRBC 0.0 /100 WBC     Legionella Antigen, Urine - Urine, Urine, Clean Catch [[id number]]  (Normal) Collected: 09/06/21 8492    Specimen: Urine, Clean  Catch Updated: 09/06/21 1940     LEGIONELLA ANTIGEN, URINE Negative    Narrative:      Presumptive negative for L. pneumophilia serogroup 1 antigen, suggesting no recent or current infection.    Troponin [[id number]]  (Normal) Collected: 09/06/21 1542    Specimen: Blood Updated: 09/06/21 1714     Troponin T <0.010 ng/mL     Narrative:      Troponin T Reference Range:  <= 0.03 ng/mL-   Negative for AMI  >0.03 ng/mL-     Abnormal for myocardial necrosis.  Clinicians would have to utilize clinical acumen, EKG, Troponin and serial changes to determine if it is an Acute Myocardial Infarction or myocardial injury due to an underlying chronic condition.       Results may be falsely decreased if patient taking Biotin.      Comprehensive Metabolic Panel [[id number]]  (Abnormal) Collected: 09/06/21 1542    Specimen: Blood Updated: 09/06/21 1619     Glucose 69 mg/dL      BUN 9 mg/dL      Creatinine 0.39 mg/dL      Sodium 134 mmol/L      Potassium 4.2 mmol/L      Comment: Slight hemolysis detected by analyzer. Results may be affected.        Chloride 103 mmol/L      CO2 20.2 mmol/L      Calcium 8.4 mg/dL      Total Protein 5.8 g/dL      Albumin 2.71 g/dL      ALT (SGPT) 75 U/L      AST (SGOT) 85 U/L      Alkaline Phosphatase 191 U/L      Total Bilirubin 0.5 mg/dL      eGFR Non African Amer >150 mL/min/1.73      Globulin 3.1 gm/dL      A/G Ratio 0.9 g/dL      BUN/Creatinine Ratio 23.1     Anion Gap 10.8 mmol/L     Narrative:      GFR Normal >60  Chronic Kidney Disease <60  Kidney Failure <15      C-reactive Protein [[id number]]  (Normal) Collected: 09/06/21 1542    Specimen: Blood Updated: 09/06/21 1619     C-Reactive Protein 0.45 mg/dL     Magnesium [[id number]]  (Normal) Collected: 09/06/21 1542    Specimen: Blood Updated: 09/06/21 1619     Magnesium 2.1 mg/dL     Hepatitis Panel, Acute [[id number]]  (Normal) Collected: 09/05/21 2203    Specimen: Blood from Arm, Left Updated: 09/06/21 1618     Hepatitis B Surface Ag Non-Reactive      Hep A IgM Non-Reactive     Hep B C IgM Non-Reactive     Hepatitis C Ab Non-Reactive    Narrative:      Results may be falsely decreased if patient taking Biotin.     TSH [[id number]]  (Abnormal) Collected: 09/05/21 2203    Specimen: Blood from Arm, Left Updated: 09/06/21 1605     TSH 5.350 uIU/mL     CBC & Differential [[id number]]  (Abnormal) Collected: 09/06/21 1542    Specimen: Blood Updated: 09/06/21 1559    Narrative:      The following orders were created for panel order CBC & Differential.  Procedure                               Abnormality         Status                     ---------                               -----------         ------                     CBC Auto Differential[[id number]]        Abnormal            Final result                 Please view results for these tests on the individual orders.    CBC Auto Differential [[id number]]  (Abnormal) Collected: 09/06/21 1542    Specimen: Blood Updated: 09/06/21 1559     WBC 10.32 10*3/mm3      RBC 4.32 10*6/mm3      Hemoglobin 13.1 g/dL      Hematocrit 40.5 %      MCV 93.8 fL      MCH 30.3 pg      MCHC 32.3 g/dL      RDW 14.4 %      RDW-SD 49.0 fl      MPV 9.6 fL      Platelets 278 10*3/mm3      Neutrophil % 88.1 %      Lymphocyte % 6.5 %      Monocyte % 4.6 %      Eosinophil % 0.1 %      Basophil % 0.3 %      Immature Grans % 0.4 %      Neutrophils, Absolute 9.10 10*3/mm3      Lymphocytes, Absolute 0.67 10*3/mm3      Monocytes, Absolute 0.47 10*3/mm3      Eosinophils, Absolute 0.01 10*3/mm3      Basophils, Absolute 0.03 10*3/mm3      Immature Grans, Absolute 0.04 10*3/mm3      nRBC 0.0 /100 WBC     Ethanol [[id number]] Collected: 09/05/21 2203    Specimen: Blood from Arm, Left Updated: 09/06/21 1557     Ethanol <10 mg/dL      Ethanol % <0.010 %     Narrative:      >/= 80.0 legally intoxicated    Acetaminophen Level [[id number]]  (Normal) Collected: 09/05/21 2203    Specimen: Blood from Arm, Left Updated: 09/06/21 1557     Acetaminophen <5.0 mcg/mL      Lactic Acid, Plasma [[id number]]  (Normal) Collected: 09/06/21 0138    Specimen: Blood from Arm, Left Updated: 09/06/21 0202     Lactate 1.9 mmol/L     Protime-INR [[id number]]  (Normal) Collected: 09/05/21 2311    Specimen: Blood from Arm, Left Updated: 09/05/21 2326     Protime 13.3 Seconds      INR 0.97    Narrative:      Suggested INR therapeutic range for stable oral anticoagulant therapy:    Low Intensity therapy:   1.5-2.0  Moderate Intensity therapy:   2.0-3.0  High Intensity therapy:   2.5-4.0    Urinalysis With Microscopic If Indicated (No Culture) - Urine, Clean Catch [[id number]]  (Normal) Collected: 09/05/21 2308    Specimen: Urine, Clean Catch Updated: 09/05/21 2318     Color, UA Yellow     Appearance, UA Clear     pH, UA 6.5     Specific Gravity, UA 1.012     Glucose, UA Negative     Ketones, UA Negative     Bilirubin, UA Negative     Blood, UA Negative     Protein, UA Negative     Leuk Esterase, UA Negative     Nitrite, UA Negative     Urobilinogen, UA 1.0 E.U./dL    Narrative:      Urine microscopic not indicated.    [place] Draw [[id number]] Collected: 09/05/21 2203    Specimen: Blood from Arm, Left Updated: 09/05/21 2316    Narrative:      The following orders were created for panel order [place] Draw.  Procedure                               Abnormality         Status                     ---------                               -----------         ------                     Green Top (Gel)[[id number]]                                  Final result               Lavender Top[[id number]]                                     Final result               Gold Top - SST[[id number]]                                   Final result               Light Blue Top[[id number]]                                   Final result                 Please view results for these tests on the individual orders.    Green Top (Gel) [[id number]] Collected: 09/05/21 2203    Specimen: Blood from Arm, Left Updated: 09/05/21 2316     Extra  Tube Hold for add-ons.     Comment: Auto resulted.       Light Blue Top [[id number]] Collected: 09/05/21 2203    Specimen: Blood from Arm, Left Updated: 09/05/21 2316     Extra Tube hold for add-on     Comment: Auto resulted       Lavender Top [[id number]] Collected: 09/05/21 2203    Specimen: Blood from Arm, Left Updated: 09/05/21 2316     Extra Tube hold for add-on     Comment: Auto resulted       Gold Top - SST [[id number]] Collected: 09/05/21 2203    Specimen: Blood from Arm, Left Updated: 09/05/21 2316     Extra Tube Hold for add-ons.     Comment: Auto resulted.       Troponin [[id number]]  (Normal) Collected: 09/05/21 2203    Specimen: Blood from Arm, Left Updated: 09/05/21 2236     Troponin T <0.010 ng/mL     Narrative:      Troponin T Reference Range:  <= 0.03 ng/mL-   Negative for AMI  >0.03 ng/mL-     Abnormal for myocardial necrosis.  Clinicians would have to utilize clinical acumen, EKG, Troponin and serial changes to determine if it is an Acute Myocardial Infarction or myocardial injury due to an underlying chronic condition.       Results may be falsely decreased if patient taking Biotin.      BNP [[id number]]  (Normal) Collected: 09/05/21 2203    Specimen: Blood from Arm, Left Updated: 09/05/21 2234     proBNP 892.8 pg/mL     Narrative:      Among patients with dyspnea, NT-proBNP is highly sensitive for the detection of acute congestive heart failure. In addition NT-proBNP of <300 pg/ml effectively rules out acute congestive heart failure with 99% negative predictive value.    Results may be falsely decreased if patient taking Biotin.      COVID-19 and FLU A/B PCR - Swab, Nasopharynx [[id number]]  (Normal) Collected: 09/05/21 2133    Specimen: Swab from Nasopharynx Updated: 09/05/21 2156     COVID19 Not Detected     Influenza A PCR Not Detected     Influenza B PCR Not Detected    Narrative:      Fact sheet for providers: https://www.fda.gov/media/090919/download    Fact sheet for patients:  https://www.fda.gov/media/256713/download    Test performed by PCR.             Physician Progress Notes (most recent note)      Herman Navas DO at 21 2016              [facility] HOSPITALIST PROGRESS NOTE     Patient Identification:  Name:  Radha Bacon  Age:  71 y.o.  Sex:  female  :  1950  MRN:  [id number]  Visit Number:  [id number]  ROOM: [address] [place] [address]     Primary Care Provider:  Noemi Tracy APRN    Length of stay in inpatient status:  3    Subjective     Chief Compliant:    Chief Complaint   Patient presents with   • Syncope       History of Presenting Illness: Seen and evaluated in follow-up for community-acquired pneumonia and syncopal episode.  Patient today without any acute complaints currently oxygenating well on room air.  Patient with episode of hypoglycemia overnight with labs corrected at that time however patient ultimately not requiring administration of D50 and improved with administration of snacks and juice.  Patient throughout the day today without any hypoglycemia.    Objective     Current Hospital Meds:cefTRIAXone, 1 g, Intravenous, Q24H  doxycycline, 100 mg, Intravenous, Q12H  enoxaparin, 30 mg, Subcutaneous, Q24H  metroNIDAZOLE, 500 mg, Intravenous, Q8H  polyethylene glycol, 17 g, Oral, Daily  sodium chloride, 10 mL, Intravenous, Q12H         Current Antimicrobial Therapy:  Anti-Infectives (From admission, onward)    Ordered     Dose/Rate Route Frequency Start Stop    21 1536  cefTRIAXone (ROCEPHIN) 1 g in sodium chloride 0.9 % 100 mL IVPB-VTB     Ordering Provider: Heramn Navas DO    1 g  200 mL/hr over 30 Minutes Intravenous Every 24 Hours 21 0000 21 23521 1536  doxycycline (VIBRAMYCIN) 100 mg in sodium chloride 0.9 % 100 mL IVPB-VTB     Ordering Provider: Herman Navas DO    100 mg  over 60 Minutes Intravenous Every 12 Hours 21 0000 21 23521 1627  metroNIDAZOLE (FLAGYL) 500 mg/100mL IVPB     Ordering  Provider: Laura Francois APRN    500 mg Intravenous Every 8 Hours 09/06/21 1715 09/13/21 1714 09/06/21 0058  cefTRIAXone (ROCEPHIN) 1 g in sodium chloride 0.9 % 100 mL IVPB-VTB     Ordering Provider: Cornell Berman MD    1 g  200 mL/hr over 30 Minutes Intravenous Once 09/06/21 0100 09/06/21 0234    09/06/21 0058  doxycycline (VIBRAMYCIN) 100 mg in sodium chloride 0.9 % 100 mL IVPB-VTB     Ordering Provider: Cornell Berman MD    100 mg  over 60 Minutes Intravenous Once 09/06/21 0100 09/06/21 0234        Current Diuretic Therapy:  Diuretics (From admission, onward)    None        ----------------------------------------------------------------------------------------------------------------------  Vital Signs:  Temp:  [98.1 °F (36.7 °C)-98.3 °F (36.8 °C)] 98.3 °F (36.8 °C)  Heart Rate:  [64-80] 64  Resp:  [16-18] 16  BP: (116-122)/(58-65) 116/65  SpO2:  [94 %-98 %] 94 %  on   ;   Device (Oxygen Therapy): room air  Body mass index is 9.43 kg/m².    Wt Readings from Last 3 Encounters:   09/06/21 27.3 kg (60 lb 3.2 oz)   08/23/21 34 kg (75 lb)   10/01/20 36.3 kg (80 lb)     Intake & Output (last 3 days)       09/07 0701 - 09/08 0700 09/08 0701 - 09/09 0700 09/09 0701 - 09/10 0700    P.O. 600 1080 360    I.V. (mL/kg) 297.6 (10.9)      IV Piggyback   100    Total Intake(mL/kg) 897.6 (32.9) 1080 (39.6) 460 (16.8)    Urine (mL/kg/hr) 875 (1.3) 1850 (2.8) 300 (0.8)    Stool 0 0 0    Total Output 875 1850 300    Net +22.6 -770 +160           Stool Unmeasured Occurrence 1 x 1 x 1 x        Diet Soft Texture; Ground; Thin  ----------------------------------------------------------------------------------------------------------------------  Physical exam:  Constitutional: Elderly frail cachectic thin female resting in bed, NAD   HENT:  Head:  Normocephalic and atraumatic.  Mouth:  Moist mucous membranes.    Eyes: Right eye surgically absent..    Neck:  Neck supple.  No JVD present.    Cardiovascular:  Normal  rate, regular rhythm and normal heart sounds.  Pulmonary/Chest:  No respiratory distress, no wheezes, no crackles, with normal breath sounds and good air movement.  Abdominal:  Soft.  Bowel sounds are normal.  No distension and no tenderness.   Musculoskeletal:  No edema, no tenderness, and no deformity.  Diffuse muscle wasting of the extremities present..   Neurological:  Alert and and answers simple questions but unable to provide more complex answers or mumble more than a few words.  No focal neurological deficits on exam though.    Skin:  Skin is warm and dry.   Peripheral vascular:  Pulses in all 4 extremities with no clubbing, no cyanosis, no edema.  Psychiatric: Appropriate mood and affect, pleasant.   ----------------------------------------------------------------------------------------------------------------------  Tele:    ----------------------------------------------------------------------------------------------------------------------  Results from last 7 days   Lab Units 09/09/21  0127 09/08/21  0518 09/07/21  0438 09/06/21  1542 09/06/21  1542 09/06/21  0138 09/05/21  2311 09/05/21  2203   CRP mg/dL  --   --   --   --  0.45  --   --   --    LACTATE mmol/L  --   --   --   --   --  1.9  --   --    WBC 10*3/mm3 8.98 8.85 10.00   < > 10.32  --   --    < >   HEMOGLOBIN g/dL 13.4 12.9 11.6*   < > 13.1  --   --    < >   HEMATOCRIT % 40.6 40.8 34.8   < > 40.5  --   --    < >   MCV fL 94.2 96.7 93.8   < > 93.8  --   --    < >   MCHC g/dL 33.0 31.6 33.3   < > 32.3  --   --    < >   PLATELETS 10*3/mm3 339 306 289   < > 278  --   --    < >   INR  1.01  --   --   --   --   --  0.97  --     < > = values in this interval not displayed.         Results from last 7 days   Lab Units 09/09/21  0127 09/08/21  0518 09/07/21  0438 09/06/21  1542 09/06/21  1542 09/05/21  2203 09/05/21  2203   SODIUM mmol/L 137 134* 137   < > 134*   < > 136   POTASSIUM mmol/L 3.7 3.3* 3.5   < > 4.2   < > 3.4*   MAGNESIUM mg/dL 1.7  --    --   --  2.1  --   --    CHLORIDE mmol/L 100 98 103   < > 103   < > 101   CO2 mmol/L 28.5 26.2 24.9   < > 20.2*   < > 23.9   BUN mg/dL 21 12 10   < > 9   < > 14   CREATININE mg/dL 0.55* 0.54* 0.33*   < > 0.39*   < > 0.59   EGFR IF NONAFRICN AM mL/min/1.73 110 112 >150   < > >150   < > 101   CALCIUM mg/dL 8.6 8.5* 8.2*   < > 8.4*   < > 8.3*   PHOSPHORUS mg/dL 3.0  --   --   --   --   --   --    GLUCOSE mg/dL 85 67 56*   < > 69   < > 84   ALBUMIN g/dL  --  3.10*  --   --  2.71*  --  3.07*   BILIRUBIN mg/dL  --  0.3  --   --  0.5  --  0.4   ALK PHOS U/L  --  176*  --   --  191*  --  183*   AST (SGOT) U/L  --  25  --   --  85*  --  59*   ALT (SGPT) U/L  --  46*  --   --  75*  --  65*    < > = values in this interval not displayed.   Estimated Creatinine Clearance: 28.6 mL/min (A) (by C-G formula based on SCr of 0.55 mg/dL (L)).  No results found for: AMMONIA  Results from last 7 days   Lab Units 09/06/21  1542 09/05/21  2203   TROPONIN T ng/mL <0.010 <0.010     Results from last 7 days   Lab Units 09/05/21  2203   PROBNP pg/mL 892.8         Glucose   Date/Time Value Ref Range Status   09/09/2021 1515 93 70 - 130 mg/dL Final     Comment:     Meter: [id number] : [id number] MIMI CAVANAUGH   09/09/2021 1053 93 70 - 130 mg/dL Final     Comment:     Meter: [id number] : [id number] Batsheva ZENDEJAS   09/09/2021 0653 64 (L) 70 - 130 mg/dL Final     Comment:     Meter: [id number] : [id number] NATALIIA NORTH   09/09/2021 0327 104 70 - 130 mg/dL Final     Comment:     Meter: [id number] : [id number] ADRIENNETAWANATIA HERNANDEZ   09/09/2021 0003 41 (C) 70 - 130 mg/dL Final     Comment:     Critical repeat  Meter: [id number] : [id number] Marianela Grant   09/08/2021 1542 99 70 - 130 mg/dL Final     Comment:     Meter: [id number] : [id number] MIMI CAVANAUGH   09/08/2021 1128 180 (H) 70 - 130 mg/dL Final     Comment:     Meter: [id number] : [id number] MIMI CAVANAUGH   09/08/2021 1115 89 70 - 130 mg/dL Final     Comment:      Meter: [id number] : [id number] Antonella Sofia     Lab Results   Component Value Date    TSH 5.350 (H) 09/05/2021     No results found for: PREGTESTUR, PREGSERUM, HCG, HCGQUANT  Pain Management Panel    There is no flowsheet data to display.       Brief Urine Lab Results  (Last result in the past 365 days)      Color   Clarity   Blood   Leuk Est   Nitrite   Protein   CREAT   Urine HCG        09/05/21 2308 Yellow Clear Negative Negative Negative Negative             Blood Culture   Date Value Ref Range Status   09/06/2021 No growth at 24 hours  Preliminary   09/06/2021 No growth at 24 hours  Preliminary     No results found for: URINECX  No results found for: WOUNDCX  No results found for: STOOLCX  No results found for: RESPCX  No results found for: AFBCX  Results from last 7 days   Lab Units 09/06/21  1542 09/06/21  0138   LACTATE mmol/L  --  1.9   CRP mg/dL 0.45  --        I have personally looked at the labs and they are summarized above.  ----------------------------------------------------------------------------------------------------------------------  Detailed radiology reports for the last 24 hours:    Imaging Results (Last 24 Hours)     ** No results found for the last 24 hours. **        Assessment & Plan      Multi-lobar pneumonia    -Patient successfully titrated down to room air today.    -Given risk for aspiration patient additionally on Flagyl in addition to Rocephin and doxycycline for empiric community-acquired coverage    -Respiratory PCR panel, strep pneumo antigen, Legionella antigen all negative    -We will plan to initiate on oral therapy tomorrow.    Syncopal episode  Persistent recurrent hypoglycemia, improving    -Patient with syncopal episode at home after patient's daughter found her leaned against a wall at the bathroom and that she then dropped her head and went limp.    -Continue continuous cardiac monitoring    -Patient with recurrent hypoglycemia overnight but improved with snacks  and no recurrent hypoglycemia throughout the day.    -Have checked laboratory evaluation for insulinoma and other endogenous sources of inappropriate insulin secretion currently pending at this time.  Continue to monitor glucose closely.    Possible ileus    -Patient with CT of the abdomen pelvis that showed gas-filled small bowel loops suggesting a generalized ileus rather than bowel obstruction at time of her initial evaluation.    -Patient has shown no tenderness to palpation, no complaints of abdominal pain and bowel sounds are active throughout patient has been having regular daily bowel movements.    Mild bilateral hydronephrosis    -No obstructing lesions noted on CT on 9/5    -UA negative, renal function within normal limits, continue catheter for now, patient up ambulating today, will remove.    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      VTE Prophylaxis:   Mechanical Order History:     None      Pharmalogical Order History:      Ordered     Dose Route Frequency Stop    09/06/21 1541  enoxaparin (LOVENOX) syringe 30 mg      30 mg SC Every 24 Hours --    09/06/21 1536  Pharmacy to Dose enoxaparin (LOVENOX)  Status:  Discontinued     Question:  Indication of use  Answer:  Prophylaxis    -- XX Continuous PRN 09/06/21 1541                Disposition patient previously at home with daughter however plan to transition to [facility] this past weekend and can be discharged there once medically stable as long as bed still available.    Herman Navas DO  [facility] Hospitalist  09/09/21  20:16 EDT      Electronically signed  by Herman Navas DO at 21 2030          Discharge Summary      Herman Navas DO at 09/10/21 1445              [facility] HOSPITALISTS DISCHARGE SUMMARY    Patient Identification:  Name:  Radha Bacon  Age:  71 y.o.  Sex:  female  :  1950  MRN:  [id number]  Visit Number:  [id number]    Date of Admission: 2021  Date of Discharge:  9/10/2021     PCP: Noemi Tracy APRN    DISCHARGE DIAGNOSIS    Multi-lobar pneumonia  Syncopal episode  Persistent recurrent hypoglycemia, improving  Mild bilateral hydronephrosis  Ruptured right breast implant  Dementia  History of esophageal stricture  Severe calorie malnutrition  History of right eye uveitis  CONSULTS       PROCEDURES PERFORMED      HOSPITAL COURSE  Patient is a 71 y.o. female presented to [facility] complaining of syncopal episode at home.  Please see the admitting history and physical for further details.      In addition to syncopal episode at home patient also found to have multi lobar pneumonia and the hospitalist service was contacted and admitted the patient for further evaluation and care for community-acquired pneumonia and syncope.  Patient had EKG, continuous telemetry monitoring, echo all which were unrevealing for source of syncope.  However into the course of patient's hospitalization after being evaluated by SLP and placed on a modified diet and with patient having good oral intake she was noted to have 2 days of persistent recurrent hypoglycemia.  Patient did receive D50 with improvement or given snacks with juice with improvement.  Due to her age and the recurrent hypoglycemia as well as the fact the patient was on no oral antihypoglycemics at home work-up was obtained for possible inappropriate endogenous insulin production.  C-peptide, insulin, beta hydroxybutyrate, and proinsulin levels were all checked.  C-peptide did return is normal the other labs were sent out and still pending at time of  patient's discharge however patient had no further episodes of hypoglycemia and given her malnourished state and thin cachectic appearance there is some concern that some of her recurrent hypoglycemia could be due to lack of proper glycogenolysis likely from inadequate glycogen stores.  Patient has had good adequate oral intake throughout her hospitalization has had no recurrence of hypoglycemia for the past 24 hours.  Additionally patient briefly did require supplemental oxygen however with initiation of antibiotics she did improve and was back to room air by time of her discharge.  Given that patient was having no further hypoglycemia, her pneumonia was improving and she was back to requiring no supplemental oxygen it was felt the patient had reached maximal benefit of continued hospitalization.  She will follow-up with her PCP in 1 week for post hospital follow-up as well as review of lab work that was sent here in the hospital for her hypoglycemia.  Patient is being discharged to skilled nursing facility that had been previously been in the process of being worked up and arranged by her family in the outpatient setting.  Patient was in stable medical condition at time of discharge and was discharged to skilled nursing facility in good spirits.    VITAL SIGNS:  Temp:  [96.9 °F (36.1 °C)-98.3 °F (36.8 °C)] 97.6 °F (36.4 °C)  Heart Rate:  [64-87] 87  Resp:  [16-18] 18  BP: (116-133)/(48-69) 133/48  SpO2:  [94 %-97 %] 97 %  on   ;   Device (Oxygen Therapy): room air    Body mass index is 9.43 kg/m².  Wt Readings from Last 3 Encounters:   09/06/21 27.3 kg (60 lb 3.2 oz)   08/23/21 34 kg (75 lb)   10/01/20 36.3 kg (80 lb)       PHYSICAL EXAM:  Constitutional: Elderly frail cachectic thin female resting in bed, NAD   HENT:  Head:  Normocephalic and atraumatic.  Mouth:  Moist mucous membranes.    Eyes: Right eye surgically absent..    Neck:  Neck supple.  No JVD present.    Cardiovascular:  Normal rate, regular rhythm  and normal heart sounds.  Pulmonary/Chest:  No respiratory distress, no wheezes, no crackles, with normal breath sounds and good air movement.  Abdominal:  Soft.  Bowel sounds are normal.  No distension and no tenderness.   Musculoskeletal:  No edema, no tenderness, and no deformity.  Diffuse muscle wasting of the extremities present..   Neurological:  Alert and and answers simple questions but unable to provide more complex answers or mumble more than a few words.  No focal neurological deficits on exam though.    Skin:  Skin is warm and dry.   Peripheral vascular:  Pulses in all 4 extremities with no clubbing, no cyanosis, no edema.  Psychiatric: Appropriate mood and affect, pleasant.     DISCHARGE DISPOSITION   Stable    DISCHARGE MEDICATIONS:     Discharge Medications      ASK your doctor about these medications      Instructions Start Date   amLODIPine 5 MG tablet  Commonly known as: NORVASC  Ask about: Which instructions should I use?   5 mg, Oral, Daily                    TEST  RESULTS PENDING AT DISCHARGE  Pending Labs     Order Current Status    Beta-Hydroxybutyrate In process    Insulin, Random In process    Proinsulin In process    Blood Culture - Blood, Arm, Left Preliminary result    Blood Culture - Blood, Arm, Right Preliminary result           CODE STATUS  Code Status and Medical Interventions:   Ordered at: 09/06/21 1630     Limited Support to NOT Include:    Intubation     Level Of Support Discussed With:    Next of Kin (If No Surrogate)     Code Status:    No CPR     Medical Interventions (Level of Support Prior to Arrest):    Limited     Comments:    NO intubation, NO CPR       Herman Navas DO  [facility]  09/10/21  14:46 EDT    Please note that this discharge summary required more than 30 minutes to complete.      Electronically signed by Herman Navas DO at 09/10/21 4031       Discharge Order (From admission, onward)     Start     Ordered    09/10/21 154  Discharge  patient  Once     Expected Discharge Date: 09/10/21    Expected Discharge Time: Afternoon    Discharge Disposition: Skilled Nursing Facility (DC - External)    Physician of Record for Attribution - Please select from Treatment Team: LY JUNG [[id number]]    Review needed by CMO to determine Physician of Record: No       Question Answer Comment   Physician of Record for Attribution - Please select from Treatment Team LY JUNG    Review needed by CMO to determine Physician of Record No        09/10/21 1547

## 2021-09-10 NOTE — DISCHARGE PLACEMENT REQUEST
"Radha Bacon (71 y.o. Female)     Date of Birth Social Security Number Address Home Phone MRN    1950  [address] [address] [phone] [id number]    Sabianism Marital Status          Taoist        Admission Date Admission Type Admitting Provider Attending Provider Department, Room/Bed    9/5/21 Emergency Herman Navas DO Cagle, William, DO [address], 3347/2S    Discharge Date Discharge Disposition Discharge Destination                       Attending Provider: Herman Navas DO    Allergies: No Known Allergies    Isolation: None   Infection: None   Code Status: No CPR    Ht: 170.2 cm (67\")   Wt: 27.3 kg (60 lb 3.2 oz)    Admission Cmt: None   Principal Problem: None                Active Insurance as of 9/5/2021     Primary Coverage     Payor Plan Insurance Group Employer/Plan Group    MEDICARE MEDICARE A & B      Payor Plan Address Payor Plan Phone Number Payor Plan Fax Number Effective Dates    [address] [phone]  3/1/2015 - None Entered    [facility] [id number]       Subscriber Name Subscriber Birth Date Member ID       RADHA BACON 1950 [id number]           Secondary Coverage     Payor Plan Insurance Group Employer/Plan Group     FOR LIFE  FOR LIFE  SUP       Payor Plan Address Payor Plan Phone Number Payor Plan Fax Number Effective Dates    [address] [phone]  1/15/2018 - None Entered    [facility] [phone]       Subscriber Name Subscriber Birth Date Member ID       RADHA BACON 1950 [id number]                 Emergency Contacts      (Rel.) Home Phone Work Phone Mobile Phone    Vangie Chu (Daughter) -- -- [phone]          "

## 2021-09-10 NOTE — NURSING NOTE
Responded to bed alarm, patient was found in floor leaning up against cabinet. Upon assessment patient had a skin tear on back of right arm. Dr. Navas made aware. Notified family.

## 2021-09-10 NOTE — DISCHARGE SUMMARY
[facility] HOSPITALISTS DISCHARGE SUMMARY    Patient Identification:  Name:  Radha Bacon  Age:  71 y.o.  Sex:  female  :  1950  MRN:  [id number]  Visit Number:  [id number]    Date of Admission: 2021  Date of Discharge:  9/10/2021     PCP: Noemi Tracy APRN    DISCHARGE DIAGNOSIS    Multi-lobar pneumonia  Syncopal episode  Persistent recurrent hypoglycemia, improving  Mild bilateral hydronephrosis  Ruptured right breast implant  Dementia  History of esophageal stricture  Severe calorie malnutrition  History of right eye uveitis  CONSULTS       PROCEDURES PERFORMED      HOSPITAL COURSE  Patient is a 71 y.o. female presented to [facility] complaining of syncopal episode at home.  Please see the admitting history and physical for further details.      In addition to syncopal episode at home patient also found to have multi lobar pneumonia and the hospitalist service was contacted and admitted the patient for further evaluation and care for community-acquired pneumonia and syncope.  Patient had EKG, continuous telemetry monitoring, echo all which were unrevealing for source of syncope.  However into the course of patient's hospitalization after being evaluated by SLP and placed on a modified diet and with patient having good oral intake she was noted to have 2 days of persistent recurrent hypoglycemia.  Patient did receive D50 with improvement or given snacks with juice with improvement.  Due to her age and the recurrent hypoglycemia as well as the fact the patient was on no oral antihypoglycemics at home work-up was obtained for possible inappropriate endogenous insulin production.  C-peptide, insulin, beta hydroxybutyrate, and proinsulin levels were all checked.  C-peptide did return is normal the other labs were sent out and still pending at time of patient's discharge however patient had no further episodes of hypoglycemia and given her malnourished state and thin  cachectic appearance there is some concern that some of her recurrent hypoglycemia could be due to lack of proper glycogenolysis likely from inadequate glycogen stores.  Patient has had good adequate oral intake throughout her hospitalization has had no recurrence of hypoglycemia for the past 24 hours.  Additionally patient briefly did require supplemental oxygen however with initiation of antibiotics she did improve and was back to room air by time of her discharge.  Given that patient was having no further hypoglycemia, her pneumonia was improving and she was back to requiring no supplemental oxygen it was felt the patient had reached maximal benefit of continued hospitalization.  She will follow-up with her PCP in 1 week for post hospital follow-up as well as review of lab work that was sent here in the hospital for her hypoglycemia.  Patient is being discharged to skilled nursing facility that had been previously been in the process of being worked up and arranged by her family in the outpatient setting.  Patient was in stable medical condition at time of discharge and was discharged to skilled nursing facility in good spirits.    VITAL SIGNS:  Temp:  [96.9 °F (36.1 °C)-98.3 °F (36.8 °C)] 97.6 °F (36.4 °C)  Heart Rate:  [64-87] 87  Resp:  [16-18] 18  BP: (116-133)/(48-69) 133/48  SpO2:  [94 %-97 %] 97 %  on   ;   Device (Oxygen Therapy): room air    Body mass index is 9.43 kg/m².  Wt Readings from Last 3 Encounters:   09/06/21 27.3 kg (60 lb 3.2 oz)   08/23/21 34 kg (75 lb)   10/01/20 36.3 kg (80 lb)       PHYSICAL EXAM:  Constitutional: Elderly frail cachectic thin female resting in bed, NAD   HENT:  Head:  Normocephalic and atraumatic.  Mouth:  Moist mucous membranes.    Eyes: Right eye surgically absent..    Neck:  Neck supple.  No JVD present.    Cardiovascular:  Normal rate, regular rhythm and normal heart sounds.  Pulmonary/Chest:  No respiratory distress, no wheezes, no crackles, with normal breath sounds  and good air movement.  Abdominal:  Soft.  Bowel sounds are normal.  No distension and no tenderness.   Musculoskeletal:  No edema, no tenderness, and no deformity.  Diffuse muscle wasting of the extremities present..   Neurological:  Alert and and answers simple questions but unable to provide more complex answers or mumble more than a few words.  No focal neurological deficits on exam though.    Skin:  Skin is warm and dry.   Peripheral vascular:  Pulses in all 4 extremities with no clubbing, no cyanosis, no edema.  Psychiatric: Appropriate mood and affect, pleasant.     DISCHARGE DISPOSITION   Stable    DISCHARGE MEDICATIONS:     Discharge Medications      ASK your doctor about these medications      Instructions Start Date   amLODIPine 5 MG tablet  Commonly known as: NORVASC  Ask about: Which instructions should I use?   5 mg, Oral, Daily                    TEST  RESULTS PENDING AT DISCHARGE  Pending Labs     Order Current Status    Beta-Hydroxybutyrate In process    Insulin, Random In process    Proinsulin In process    Blood Culture - Blood, Arm, Left Preliminary result    Blood Culture - Blood, Arm, Right Preliminary result           CODE STATUS  Code Status and Medical Interventions:   Ordered at: 09/06/21 1630     Limited Support to NOT Include:    Intubation     Level Of Support Discussed With:    Next of Kin (If No Surrogate)     Code Status:    No CPR     Medical Interventions (Level of Support Prior to Arrest):    Limited     Comments:    NO intubation, NO CPR       Herman Navas DO  [facility] Hospitalist  09/10/21  14:46 EDT    Please note that this discharge summary required more than 30 minutes to complete.

## 2021-09-10 NOTE — CASE MANAGEMENT/SOCIAL WORK
Discharge Planning Assessment  [facility]     Patient Name: Radha Bacon  MRN: [id number]  Today's Date: 9/10/2021    Admit Date: 9/5/2021    Discharge Plan     Row Name 09/10/21 1121       Plan    Plan SS was notified by Dr. Navas that he anticipates pt will be ready for discharge to the nursing home on this date. SS contacted [facility] per Vicki who states she will call back regarding bed availability. SS to follow.    12:49 pm:   SS was informed by Vicki at [facility] that pt will have a bed ready after 17:00 today. SS notified Dr. Navas. SS to follow.    15:55 pm: Pt is being discharged to [facility] today. SS faxed clinicals to [facility] [phone]. RN to call report to [facility] per Leticia at [phone]. SS contacted [facility] [phone] per Rika to arrange transport. SS faxed face sheet to [facility] [phone]. [facility] to call SS back after pt's insurance is verified by the billing department. SS to follow.    16:12 pm: SS spoke to pt's daughter, Vangie who voices agreement for pt to be discharged to [facility] today. SS received call back from [facility] per Rika stating they will be here to pick pt up between 9:00-10:00 pm. No other needs identified.           Continued Care and Services - Admitted Since 9/5/2021     Destination     Service Provider Request Status Selected Services Address Phone Fax Patient Preferred    [facility]  Pending - No Request Sent N/A [address] [facility] [address] [phone] --              JOSE Rao

## 2021-09-10 NOTE — PROGRESS NOTES
[facility] HOSPITALIST PROGRESS NOTE     Patient Identification:  Name:  Radha Bacon  Age:  71 y.o.  Sex:  female  :  1950  MRN:  [id number]  Visit Number:  [id number]  ROOM: [address] [place] [address]     Primary Care Provider:  Noemi Tracy APRN    Length of stay in inpatient status:  3    Subjective     Chief Compliant:    Chief Complaint   Patient presents with   • Syncope       History of Presenting Illness: Seen and evaluated in follow-up for community-acquired pneumonia and syncopal episode.  Patient today without any acute complaints currently oxygenating well on room air.  Patient with episode of hypoglycemia overnight with labs corrected at that time however patient ultimately not requiring administration of D50 and improved with administration of snacks and juice.  Patient throughout the day today without any hypoglycemia.    Objective     Current Hospital Meds:cefTRIAXone, 1 g, Intravenous, Q24H  doxycycline, 100 mg, Intravenous, Q12H  enoxaparin, 30 mg, Subcutaneous, Q24H  metroNIDAZOLE, 500 mg, Intravenous, Q8H  polyethylene glycol, 17 g, Oral, Daily  sodium chloride, 10 mL, Intravenous, Q12H         Current Antimicrobial Therapy:  Anti-Infectives (From admission, onward)    Ordered     Dose/Rate Route Frequency Start Stop    21 1536  cefTRIAXone (ROCEPHIN) 1 g in sodium chloride 0.9 % 100 mL IVPB-VTB     Ordering Provider: Herman Navas DO    1 g  200 mL/hr over 30 Minutes Intravenous Every 24 Hours 21 0000 21 2359    21 1536  doxycycline (VIBRAMYCIN) 100 mg in sodium chloride 0.9 % 100 mL IVPB-VTB     Ordering Provider: Herman Navas DO    100 mg  over 60 Minutes Intravenous Every 12 Hours 21 0000 21 2359    21 1627  metroNIDAZOLE (FLAGYL) 500 mg/100mL IVPB     Ordering Provider: Laura Francois APRN    500 mg Intravenous Every 8 Hours 21 1715 21 1714    21 0058  cefTRIAXone (ROCEPHIN) 1 g in sodium chloride 0.9 % 100  mL IVPB-VTB     Ordering Provider: Cornell Berman MD    1 g  200 mL/hr over 30 Minutes Intravenous Once 09/06/21 0100 09/06/21 0234    09/06/21 0058  doxycycline (VIBRAMYCIN) 100 mg in sodium chloride 0.9 % 100 mL IVPB-VTB     Ordering Provider: Cornell Berman MD    100 mg  over 60 Minutes Intravenous Once 09/06/21 0100 09/06/21 0234        Current Diuretic Therapy:  Diuretics (From admission, onward)    None        ----------------------------------------------------------------------------------------------------------------------  Vital Signs:  Temp:  [98.1 °F (36.7 °C)-98.3 °F (36.8 °C)] 98.3 °F (36.8 °C)  Heart Rate:  [64-80] 64  Resp:  [16-18] 16  BP: (116-122)/(58-65) 116/65  SpO2:  [94 %-98 %] 94 %  on   ;   Device (Oxygen Therapy): room air  Body mass index is 9.43 kg/m².    Wt Readings from Last 3 Encounters:   09/06/21 27.3 kg (60 lb 3.2 oz)   08/23/21 34 kg (75 lb)   10/01/20 36.3 kg (80 lb)     Intake & Output (last 3 days)       09/07 0701 - 09/08 0700 09/08 0701 - 09/09 0700 09/09 0701 - 09/10 0700    P.O. 600 1080 360    I.V. (mL/kg) 297.6 (10.9)      IV Piggyback   100    Total Intake(mL/kg) 897.6 (32.9) 1080 (39.6) 460 (16.8)    Urine (mL/kg/hr) 875 (1.3) 1850 (2.8) 300 (0.8)    Stool 0 0 0    Total Output 875 1850 300    Net +22.6 -770 +160           Stool Unmeasured Occurrence 1 x 1 x 1 x        Diet Soft Texture; Ground; Thin  ----------------------------------------------------------------------------------------------------------------------  Physical exam:  Constitutional: Elderly frail cachectic thin female resting in bed, NAD   HENT:  Head:  Normocephalic and atraumatic.  Mouth:  Moist mucous membranes.    Eyes: Right eye surgically absent..    Neck:  Neck supple.  No JVD present.    Cardiovascular:  Normal rate, regular rhythm and normal heart sounds.  Pulmonary/Chest:  No respiratory distress, no wheezes, no crackles, with normal breath sounds and good air movement.  Abdominal:   Soft.  Bowel sounds are normal.  No distension and no tenderness.   Musculoskeletal:  No edema, no tenderness, and no deformity.  Diffuse muscle wasting of the extremities present..   Neurological:  Alert and and answers simple questions but unable to provide more complex answers or mumble more than a few words.  No focal neurological deficits on exam though.    Skin:  Skin is warm and dry.   Peripheral vascular:  Pulses in all 4 extremities with no clubbing, no cyanosis, no edema.  Psychiatric: Appropriate mood and affect, pleasant.   ----------------------------------------------------------------------------------------------------------------------  Tele:    ----------------------------------------------------------------------------------------------------------------------  Results from last 7 days   Lab Units 09/09/21  0127 09/08/21  0518 09/07/21  0438 09/06/21  1542 09/06/21  1542 09/06/21  0138 09/05/21  2311 09/05/21  2203   CRP mg/dL  --   --   --   --  0.45  --   --   --    LACTATE mmol/L  --   --   --   --   --  1.9  --   --    WBC 10*3/mm3 8.98 8.85 10.00   < > 10.32  --   --    < >   HEMOGLOBIN g/dL 13.4 12.9 11.6*   < > 13.1  --   --    < >   HEMATOCRIT % 40.6 40.8 34.8   < > 40.5  --   --    < >   MCV fL 94.2 96.7 93.8   < > 93.8  --   --    < >   MCHC g/dL 33.0 31.6 33.3   < > 32.3  --   --    < >   PLATELETS 10*3/mm3 339 306 289   < > 278  --   --    < >   INR  1.01  --   --   --   --   --  0.97  --     < > = values in this interval not displayed.         Results from last 7 days   Lab Units 09/09/21  0127 09/08/21  0518 09/07/21  0438 09/06/21  1542 09/06/21  1542 09/05/21  2203 09/05/21  2203   SODIUM mmol/L 137 134* 137   < > 134*   < > 136   POTASSIUM mmol/L 3.7 3.3* 3.5   < > 4.2   < > 3.4*   MAGNESIUM mg/dL 1.7  --   --   --  2.1  --   --    CHLORIDE mmol/L 100 98 103   < > 103   < > 101   CO2 mmol/L 28.5 26.2 24.9   < > 20.2*   < > 23.9   BUN mg/dL 21 12 10   < > 9   < > 14   CREATININE  mg/dL 0.55* 0.54* 0.33*   < > 0.39*   < > 0.59   EGFR IF NONAFRICN AM mL/min/1.73 110 112 >150   < > >150   < > 101   CALCIUM mg/dL 8.6 8.5* 8.2*   < > 8.4*   < > 8.3*   PHOSPHORUS mg/dL 3.0  --   --   --   --   --   --    GLUCOSE mg/dL 85 67 56*   < > 69   < > 84   ALBUMIN g/dL  --  3.10*  --   --  2.71*  --  3.07*   BILIRUBIN mg/dL  --  0.3  --   --  0.5  --  0.4   ALK PHOS U/L  --  176*  --   --  191*  --  183*   AST (SGOT) U/L  --  25  --   --  85*  --  59*   ALT (SGPT) U/L  --  46*  --   --  75*  --  65*    < > = values in this interval not displayed.   Estimated Creatinine Clearance: 28.6 mL/min (A) (by C-G formula based on SCr of 0.55 mg/dL (L)).  No results found for: AMMONIA  Results from last 7 days   Lab Units 09/06/21  1542 09/05/21  2203   TROPONIN T ng/mL <0.010 <0.010     Results from last 7 days   Lab Units 09/05/21  2203   PROBNP pg/mL 892.8         Glucose   Date/Time Value Ref Range Status   09/09/2021 1515 93 70 - 130 mg/dL Final     Comment:     Meter: [id number] : [id number] MIMI CAVANAUGH   09/09/2021 1053 93 70 - 130 mg/dL Final     Comment:     Meter: [id number] : [id number] Batsheva Fosteressa L   09/09/2021 0653 64 (L) 70 - 130 mg/dL Final     Comment:     Meter: [id number] : [id number] NATALIIA NORTH   09/09/2021 0327 104 70 - 130 mg/dL Final     Comment:     Meter: [id number] : [id number] FRED HERNANDEZ   09/09/2021 0003 41 (C) 70 - 130 mg/dL Final     Comment:     Critical repeat  Meter: [id number] : [id number] Marianela Grant   09/08/2021 1542 99 70 - 130 mg/dL Final     Comment:     Meter: [id number] : [id number] MIMI CAVANAUGH   09/08/2021 1128 180 (H) 70 - 130 mg/dL Final     Comment:     Meter: [id number] : [id number] MIMI CAVANAUGH   09/08/2021 1115 89 70 - 130 mg/dL Final     Comment:     Meter: [id number] : [id number] Antonella Sofia     Lab Results   Component Value Date    TSH 5.350 (H) 09/05/2021     No results found for: PREGTESTUR, PREGSERUM, HCG,  HCGQUANT  Pain Management Panel    There is no flowsheet data to display.       Brief Urine Lab Results  (Last result in the past 365 days)      Color   Clarity   Blood   Leuk Est   Nitrite   Protein   CREAT   Urine HCG        09/05/21 2308 Yellow Clear Negative Negative Negative Negative             Blood Culture   Date Value Ref Range Status   09/06/2021 No growth at 24 hours  Preliminary   09/06/2021 No growth at 24 hours  Preliminary     No results found for: URINECX  No results found for: WOUNDCX  No results found for: STOOLCX  No results found for: RESPCX  No results found for: AFBCX  Results from last 7 days   Lab Units 09/06/21  1542 09/06/21  0138   LACTATE mmol/L  --  1.9   CRP mg/dL 0.45  --        I have personally looked at the labs and they are summarized above.  ----------------------------------------------------------------------------------------------------------------------  Detailed radiology reports for the last 24 hours:    Imaging Results (Last 24 Hours)     ** No results found for the last 24 hours. **        Assessment & Plan      Multi-lobar pneumonia    -Patient successfully titrated down to room air today.    -Given risk for aspiration patient additionally on Flagyl in addition to Rocephin and doxycycline for empiric community-acquired coverage    -Respiratory PCR panel, strep pneumo antigen, Legionella antigen all negative    -We will plan to initiate on oral therapy tomorrow.    Syncopal episode  Persistent recurrent hypoglycemia, improving    -Patient with syncopal episode at home after patient's daughter found her leaned against a wall at the bathroom and that she then dropped her head and went limp.    -Continue continuous cardiac monitoring    -Patient with recurrent hypoglycemia overnight but improved with snacks and no recurrent hypoglycemia throughout the day.    -Have checked laboratory evaluation for insulinoma and other endogenous sources of inappropriate insulin secretion  currently pending at this time.  Continue to monitor glucose closely.    Possible ileus    -Patient with CT of the abdomen pelvis that showed gas-filled small bowel loops suggesting a generalized ileus rather than bowel obstruction at time of her initial evaluation.    -Patient has shown no tenderness to palpation, no complaints of abdominal pain and bowel sounds are active throughout patient has been having regular daily bowel movements.    Mild bilateral hydronephrosis    -No obstructing lesions noted on CT on 9/5    -UA negative, renal function within normal limits, continue catheter for now, patient up ambulating today, will remove.    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      VTE Prophylaxis:   Mechanical Order History:     None      Pharmalogical Order History:      Ordered     Dose Route Frequency Stop    09/06/21 1541  enoxaparin (LOVENOX) syringe 30 mg      30 mg SC Every 24 Hours --    09/06/21 1536  Pharmacy to Dose enoxaparin (LOVENOX)  Status:  Discontinued     Question:  Indication of use  Answer:  Prophylaxis    -- XX Continuous PRN 09/06/21 1541                Disposition patient previously at home with daughter however plan to transition to [facility] this past weekend and can be discharged there once medically stable as long as bed still available.    Herman Navas DO  [facility] ERist  09/09/21  20:16 EDT

## 2021-09-10 NOTE — DISCHARGE PLACEMENT REQUEST
"Radha Bacon (71 y.o. Female)     Date of Birth Social Security Number Address Home Phone MRN    1950  [address] [address] [phone] [id number]    Congregational Marital Status          Yarsanism        Admission Date Admission Type Admitting Provider Attending Provider Department, Room/Bed    9/5/21 Emergency Herman Navas DO Cagle, William, DO [address], 3347/2S    Discharge Date Discharge Disposition Discharge Destination         Skilled Nursing Facility (DC - External)              Attending Provider: Herman Navas DO    Allergies: No Known Allergies    Isolation: None   Infection: None   Code Status: No CPR    Ht: 170.2 cm (67\")   Wt: 27.3 kg (60 lb 3.2 oz)    Admission Cmt: None   Principal Problem: None                Active Insurance as of 9/5/2021     Primary Coverage     Payor Plan Insurance Group Employer/Plan Group    MEDICARE MEDICARE A & B      Payor Plan Address Payor Plan Phone Number Payor Plan Fax Number Effective Dates    [address] [phone]  3/1/2015 - None Entered    [facility] [address]       Subscriber Name Subscriber Birth Date Member ID       RADHA BACON 1950 [id number]           Secondary Coverage     Payor Plan Insurance Group Employer/Plan Group     FOR LIFE  FOR LIFE  SUP       Payor Plan Address Payor Plan Phone Number Payor Plan Fax Number Effective Dates    [address] [phone]  1/15/2018 - None Entered    [facility] [address]       Subscriber Name Subscriber Birth Date Member ID       RADHA BACON 1950 [id number]                 Emergency Contacts      (Rel.) Home Phone Work Phone Mobile Phone    Vangie Chu (Daughter) -- -- [phone]              Facility-Administered Medications as of 9/10/2021   Medication Dose Route Frequency Provider Last Rate Last Admin   • cefdinir (OMNICEF) capsule 300 mg  300 mg Oral Q12H Herman Navas DO   300 mg at 09/10/21 1401 "   • [COMPLETED] cefTRIAXone (ROCEPHIN) 1 g in sodium chloride 0.9 % 100 mL IVPB-VTB  1 g Intravenous Once Cornell Berman MD   Stopped at 09/06/21 0234   • dextrose (D50W) 25 g/ 50mL Intravenous Solution 25 g  25 g Intravenous Q15 Min PRN Laura Francois APRN   25 g at 09/08/21 1108   • dextrose (GLUTOSE) oral gel 15 g  15 g Oral Q15 Min PRN Laura Francois APRN       • doxycycline (MONODOX) capsule 100 mg  100 mg Oral Q12H Herman Navas, DO   100 mg at 09/10/21 1220   • [COMPLETED] doxycycline (VIBRAMYCIN) 100 mg in sodium chloride 0.9 % 100 mL IVPB-VTB  100 mg Intravenous Once Cornell Berman MD   Stopped at 09/06/21 0234   • enoxaparin (LOVENOX) syringe 30 mg  30 mg Subcutaneous Q24H Herman Navas DO   30 mg at 09/09/21 1707   • glucagon (human recombinant) (GLUCAGEN DIAGNOSTIC) injection 1 mg  1 mg Subcutaneous Q15 Min PRN Laura Francois APRN       • [COMPLETED] ipratropium-albuterol (DUO-NEB) nebulizer solution 3 mL  3 mL Nebulization Once Johnny Mcnair DO   3 mL at 09/06/21 0821   • metroNIDAZOLE (FLAGYL) 500 mg/100mL IVPB  500 mg Intravenous Q8H Laura Francois APRN 200 mL/hr at 09/09/21 1653 500 mg at 09/10/21 0829   • polyethylene glycol (MIRALAX) packet 17 g  17 g Oral Daily Laura Francois APRN   17 g at 09/10/21 0830   • [COMPLETED] potassium chloride (K-DUR,KLOR-CON) CR tablet 40 mEq  40 mEq Oral Once Johnny Mcnair DO   40 mEq at 09/06/21 0808   • [COMPLETED] sodium chloride 0.9 % bolus 1,000 mL  1,000 mL Intravenous Once Cornell Berman MD   Stopped at 09/06/21 0137   • sodium chloride 0.9 % flush 10 mL  10 mL Intravenous PRN Cornell Berman MD       • sodium chloride 0.9 % flush 10 mL  10 mL Intravenous Q12H Hemran Navas DO   10 mL at 09/10/21 1221   • sodium chloride 0.9 % flush 10 mL  10 mL Intravenous PRN Herman Navas DO   10 mL at 09/10/21 0873

## 2021-09-11 LAB
BACTERIA SPEC AEROBE CULT: NORMAL
BACTERIA SPEC AEROBE CULT: NORMAL

## 2021-09-12 LAB — B-OH-BUTYR SERPL-MCNC: 1 MG/DL

## 2021-09-13 ENCOUNTER — PATIENT OUTREACH (OUTPATIENT)
Dept: CASE MANAGEMENT | Facility: OTHER | Age: 71
End: 2021-09-13

## 2021-09-13 LAB — PROINSULIN SERPL-SCNC: 4 PMOL/L (ref 0–10)

## 2021-09-13 NOTE — OUTREACH NOTE
Ambulatory Case Management Note    SNF Follow-up    Questions/Answers      Responses   Acute Facility Discharged From  [place]   Acute Discharge Date  09/10/21   Name of the Skilled Nursing Facility?  [facility]   Purpose of SNF Admission  PT, OT   Estimated length of stay for the patient?  TBD   Who is the insurance provider or payor of patient stay?  Medicare   Progression of Patient?  D/C'd to SNF after hospital discharge,  will monitor for dc from SNF and outreach as appropriate            Karissa Sanchez RN  Ambulatory Case Management    9/13/2021, 10:03 EDT

## 2021-09-15 LAB — INSULIN SERPL-ACNC: 1.2 UIU/ML

## 2021-11-10 ENCOUNTER — PATIENT OUTREACH (OUTPATIENT)
Dept: CASE MANAGEMENT | Facility: OTHER | Age: 71
End: 2021-11-10

## 2021-11-10 NOTE — OUTREACH NOTE
Ambulatory Case Management Note    SNF Follow-up    Questions/Answers      Responses   Acute Facility Discharged From [facility]   Acute Discharge Date 09/10/21   Name of the Skilled Nursing Facility? [facility]   Purpose of SNF Admission PT,  OT   Estimated length of stay for the patient? TBD   Who is the insurance provider or payor of patient stay? Medicare   Progression of Patient? Pt continues with rehab,  will continue to monitor            Karissa Sanchez RN  Ambulatory Case Management    11/10/2021, 10:12 EST

## 2022-01-03 ENCOUNTER — PATIENT OUTREACH (OUTPATIENT)
Dept: CASE MANAGEMENT | Facility: OTHER | Age: 72
End: 2022-01-03

## 2022-01-03 NOTE — OUTREACH NOTE
Ambulatory Case Management Note    SNF Follow-up    Questions/Answers      Responses   Acute Facility Discharged From [facility]   Acute Discharge Date 09/10/21   Name of the Skilled Nursing Facility? [facility]   Purpose of SNF Admission PT,  OT,  LTC   Estimated length of stay for the patient? LTC   Who is the insurance provider or payor of patient stay? Medicare   Progression of Patient? Patient residing in SNF   Where was the patient discharged to? LTC              Karissa Sanchez RN  Ambulatory Case Management    1/3/2022, 15:49 EST

## 2023-04-24 ENCOUNTER — HOSPITAL ENCOUNTER (EMERGENCY)
Facility: HOSPITAL | Age: 73
Discharge: NURSING, REHAB OR LONG-TERM CARE | End: 2023-04-25
Attending: STUDENT IN AN ORGANIZED HEALTH CARE EDUCATION/TRAINING PROGRAM | Admitting: STUDENT IN AN ORGANIZED HEALTH CARE EDUCATION/TRAINING PROGRAM
Payer: MEDICARE

## 2023-04-24 ENCOUNTER — APPOINTMENT (OUTPATIENT)
Dept: CT IMAGING | Facility: HOSPITAL | Age: 73
End: 2023-04-24
Payer: MEDICARE

## 2023-04-24 ENCOUNTER — APPOINTMENT (OUTPATIENT)
Dept: GENERAL RADIOLOGY | Facility: HOSPITAL | Age: 73
End: 2023-04-24
Payer: MEDICARE

## 2023-04-24 DIAGNOSIS — F03.90 DEMENTIA, UNSPECIFIED DEMENTIA SEVERITY, UNSPECIFIED DEMENTIA TYPE, UNSPECIFIED WHETHER BEHAVIORAL, PSYCHOTIC, OR MOOD DISTURBANCE OR ANXIETY: Primary | ICD-10-CM

## 2023-04-24 LAB
ALBUMIN SERPL-MCNC: 4.2 G/DL (ref 3.5–5.2)
ALBUMIN/GLOB SERPL: 1.3 G/DL
ALP SERPL-CCNC: 171 U/L (ref 39–117)
ALT SERPL W P-5'-P-CCNC: 29 U/L (ref 1–33)
AMPHET+METHAMPHET UR QL: NEGATIVE
AMPHETAMINES UR QL: NEGATIVE
ANION GAP SERPL CALCULATED.3IONS-SCNC: 12.7 MMOL/L (ref 5–15)
AST SERPL-CCNC: 19 U/L (ref 1–32)
BACTERIA UR QL AUTO: ABNORMAL /HPF
BARBITURATES UR QL SCN: NEGATIVE
BASOPHILS # BLD AUTO: 0.05 10*3/MM3 (ref 0–0.2)
BASOPHILS NFR BLD AUTO: 0.4 % (ref 0–1.5)
BENZODIAZ UR QL SCN: NEGATIVE
BILIRUB SERPL-MCNC: 0.2 MG/DL (ref 0–1.2)
BILIRUB UR QL STRIP: NEGATIVE
BUN SERPL-MCNC: 15 MG/DL (ref 8–23)
BUN/CREAT SERPL: 19 (ref 7–25)
BUPRENORPHINE SERPL-MCNC: NEGATIVE NG/ML
CALCIUM SPEC-SCNC: 9.6 MG/DL (ref 8.6–10.5)
CANNABINOIDS SERPL QL: NEGATIVE
CHLORIDE SERPL-SCNC: 104 MMOL/L (ref 98–107)
CLARITY UR: CLEAR
CO2 SERPL-SCNC: 26.3 MMOL/L (ref 22–29)
COCAINE UR QL: NEGATIVE
COLOR UR: YELLOW
CREAT SERPL-MCNC: 0.79 MG/DL (ref 0.57–1)
DEPRECATED RDW RBC AUTO: 47 FL (ref 37–54)
EGFRCR SERPLBLD CKD-EPI 2021: 80.6 ML/MIN/1.73
EOSINOPHIL # BLD AUTO: 0.06 10*3/MM3 (ref 0–0.4)
EOSINOPHIL NFR BLD AUTO: 0.5 % (ref 0.3–6.2)
ERYTHROCYTE [DISTWIDTH] IN BLOOD BY AUTOMATED COUNT: 14.8 % (ref 12.3–15.4)
GLOBULIN UR ELPH-MCNC: 3.3 GM/DL
GLUCOSE BLDC GLUCOMTR-MCNC: 98 MG/DL (ref 70–130)
GLUCOSE SERPL-MCNC: 104 MG/DL (ref 65–99)
GLUCOSE UR STRIP-MCNC: NEGATIVE MG/DL
HCT VFR BLD AUTO: 45.7 % (ref 34–46.6)
HGB BLD-MCNC: 14.2 G/DL (ref 12–15.9)
HGB UR QL STRIP.AUTO: NEGATIVE
HOLD SPECIMEN: NORMAL
HOLD SPECIMEN: NORMAL
HYALINE CASTS UR QL AUTO: ABNORMAL /LPF
IMM GRANULOCYTES # BLD AUTO: 0.1 10*3/MM3 (ref 0–0.05)
IMM GRANULOCYTES NFR BLD AUTO: 0.8 % (ref 0–0.5)
KETONES UR QL STRIP: NEGATIVE
LEUKOCYTE ESTERASE UR QL STRIP.AUTO: ABNORMAL
LYMPHOCYTES # BLD AUTO: 1.23 10*3/MM3 (ref 0.7–3.1)
LYMPHOCYTES NFR BLD AUTO: 9.7 % (ref 19.6–45.3)
MCH RBC QN AUTO: 26.9 PG (ref 26.6–33)
MCHC RBC AUTO-ENTMCNC: 31.1 G/DL (ref 31.5–35.7)
MCV RBC AUTO: 86.6 FL (ref 79–97)
METHADONE UR QL SCN: NEGATIVE
MONOCYTES # BLD AUTO: 0.56 10*3/MM3 (ref 0.1–0.9)
MONOCYTES NFR BLD AUTO: 4.4 % (ref 5–12)
NEUTROPHILS NFR BLD AUTO: 10.68 10*3/MM3 (ref 1.7–7)
NEUTROPHILS NFR BLD AUTO: 84.2 % (ref 42.7–76)
NITRITE UR QL STRIP: NEGATIVE
NRBC BLD AUTO-RTO: 0 /100 WBC (ref 0–0.2)
OPIATES UR QL: NEGATIVE
OXYCODONE UR QL SCN: NEGATIVE
PCP UR QL SCN: NEGATIVE
PH UR STRIP.AUTO: 5.5 [PH] (ref 5–8)
PLATELET # BLD AUTO: 307 10*3/MM3 (ref 140–450)
PMV BLD AUTO: 10.6 FL (ref 6–12)
POTASSIUM SERPL-SCNC: 4.2 MMOL/L (ref 3.5–5.2)
PROPOXYPH UR QL: NEGATIVE
PROT SERPL-MCNC: 7.5 G/DL (ref 6–8.5)
PROT UR QL STRIP: NEGATIVE
RBC # BLD AUTO: 5.28 10*6/MM3 (ref 3.77–5.28)
RBC # UR STRIP: ABNORMAL /HPF
REF LAB TEST METHOD: ABNORMAL
SODIUM SERPL-SCNC: 143 MMOL/L (ref 136–145)
SP GR UR STRIP: 1.01 (ref 1–1.03)
SQUAMOUS #/AREA URNS HPF: ABNORMAL /HPF
TRICYCLICS UR QL SCN: NEGATIVE
UROBILINOGEN UR QL STRIP: ABNORMAL
WBC # UR STRIP: ABNORMAL /HPF
WBC NRBC COR # BLD: 12.68 10*3/MM3 (ref 3.4–10.8)
WHOLE BLOOD HOLD COAG: NORMAL
WHOLE BLOOD HOLD SPECIMEN: NORMAL

## 2023-04-24 PROCEDURE — 80306 DRUG TEST PRSMV INSTRMNT: CPT | Performed by: STUDENT IN AN ORGANIZED HEALTH CARE EDUCATION/TRAINING PROGRAM

## 2023-04-24 PROCEDURE — 85025 COMPLETE CBC W/AUTO DIFF WBC: CPT | Performed by: STUDENT IN AN ORGANIZED HEALTH CARE EDUCATION/TRAINING PROGRAM

## 2023-04-24 PROCEDURE — 99284 EMERGENCY DEPT VISIT MOD MDM: CPT

## 2023-04-24 PROCEDURE — 71045 X-RAY EXAM CHEST 1 VIEW: CPT

## 2023-04-24 PROCEDURE — 70450 CT HEAD/BRAIN W/O DYE: CPT

## 2023-04-24 PROCEDURE — 71045 X-RAY EXAM CHEST 1 VIEW: CPT | Performed by: RADIOLOGY

## 2023-04-24 PROCEDURE — 36415 COLL VENOUS BLD VENIPUNCTURE: CPT

## 2023-04-24 PROCEDURE — P9612 CATHETERIZE FOR URINE SPEC: HCPCS

## 2023-04-24 PROCEDURE — 80053 COMPREHEN METABOLIC PANEL: CPT | Performed by: STUDENT IN AN ORGANIZED HEALTH CARE EDUCATION/TRAINING PROGRAM

## 2023-04-24 PROCEDURE — 82962 GLUCOSE BLOOD TEST: CPT

## 2023-04-24 PROCEDURE — 81001 URINALYSIS AUTO W/SCOPE: CPT | Performed by: STUDENT IN AN ORGANIZED HEALTH CARE EDUCATION/TRAINING PROGRAM

## 2023-04-24 RX ORDER — SODIUM CHLORIDE 0.9 % (FLUSH) 0.9 %
10 SYRINGE (ML) INJECTION AS NEEDED
Status: DISCONTINUED | OUTPATIENT
Start: 2023-04-24 | End: 2023-04-25 | Stop reason: HOSPADM

## 2023-04-25 VITALS
RESPIRATION RATE: 16 BRPM | DIASTOLIC BLOOD PRESSURE: 87 MMHG | HEIGHT: 67 IN | WEIGHT: 130 LBS | OXYGEN SATURATION: 97 % | BODY MASS INDEX: 20.4 KG/M2 | TEMPERATURE: 98.2 F | SYSTOLIC BLOOD PRESSURE: 103 MMHG | HEART RATE: 88 BPM

## 2023-04-25 NOTE — ED NOTES
[facility] declined transport back to [facility], stated it was past their cut off.

## 2023-04-25 NOTE — ED NOTES
Called report to [facility] due to patient being discharged. They also do not have transportation for her back to their facility.

## 2023-04-25 NOTE — ED PROVIDER NOTES
Subjective   History of Present Illness  72-year-old female with baseline dementia who is nonverbal presents to the ER after concerns for possible seizure type episode while eating at the local nursing home.  No seizure-like activity noted on arrival.  Nursing home staff also noted concerns for choking on food.  Patient does not appear to be short of breath.  Review of systems limited by patient's baseline dementia.  No fever.  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     Appearance: Normal appearance. She is not ill-appearing.   HENT:      Head: Normocephalic and atraumatic.      Right Ear: External ear normal.      Left Ear: External ear normal.      Nose: Nose normal.      Mouth/Throat:      Mouth: Mucous membranes are moist.   Eyes:      Extraocular Movements: Extraocular movements intact.      Pupils: Pupils are equal, round, and reactive to light.   Cardiovascular:      Rate and Rhythm: Normal rate and regular rhythm.      Heart sounds: No murmur heard.  Pulmonary:      Effort: Pulmonary effort is normal. No respiratory distress.      Breath sounds: Normal breath sounds. No wheezing.   Abdominal:      General: Bowel sounds are normal.      Palpations: Abdomen is soft.      Tenderness: There is no abdominal tenderness.    Musculoskeletal:         General: No deformity or signs of injury. Normal range of motion.      Cervical back: Normal range of motion and neck supple.   Skin:     General: Skin is warm and dry.      Findings: No erythema.   Neurological:      General: No focal deficit present.      Mental Status: She is alert. Mental status is at baseline. She is disoriented.      Cranial Nerves: No cranial nerve deficit.   Psychiatric:         Mood and Affect: Mood normal.         Behavior: Behavior normal.         Thought Content: Thought content normal.         Procedures           ED Course                                           Medical Decision Making  CT imaging of the head noted no acute abnormality.  Chest x-ray unremarkable.  CBC and CMP unremarkable.  Urinalysis is also unremarkable.  Patient is currently on Rocephin at the nursing home.  Patient will be discharged back to the nursing home for further monitoring and care.  No seizure-like activity noted throughout the entire ER course.  Vitals stable at discharge.    Amount and/or Complexity of Data Reviewed  Labs: ordered.  Radiology: ordered.      Risk  Prescription drug management.          Final diagnoses:   Dementia, unspecified dementia severity, unspecified dementia type, unspecified whether behavioral, psychotic, or mood disturbance or anxiety       ED Disposition  ED Disposition     ED Disposition   Discharge    Condition   Stable    Comment   --             Noemi Tracy, APRN  [id number] [facility] [address]  [facility] [address]  [phone]    In 1 week      [facility] Emergency Department  [address] [place] [id number] [phone]    If symptoms worsen         Medication List      No changes were made to your prescriptions during this visit.          Aris Fallon DO  04/24/23 2200

## 2024-10-26 ENCOUNTER — HOSPITAL ENCOUNTER (EMERGENCY)
Facility: HOSPITAL | Age: 74
Discharge: HOME | End: 2024-10-26
Attending: EMERGENCY MEDICINE
Payer: MEDICARE

## 2024-10-26 ENCOUNTER — APPOINTMENT (OUTPATIENT)
Dept: GENERAL RADIOLOGY | Facility: HOSPITAL | Age: 74
End: 2024-10-26
Payer: MEDICARE

## 2024-10-26 VITALS
BODY MASS INDEX: 23.05 KG/M2 | SYSTOLIC BLOOD PRESSURE: 94 MMHG | TEMPERATURE: 98 F | HEART RATE: 104 BPM | WEIGHT: 135 LBS | HEIGHT: 64 IN | OXYGEN SATURATION: 97 % | RESPIRATION RATE: 16 BRPM | DIASTOLIC BLOOD PRESSURE: 62 MMHG

## 2024-10-26 DIAGNOSIS — S40.011A CONTUSION OF RIGHT SHOULDER, INITIAL ENCOUNTER: Primary | ICD-10-CM

## 2024-10-26 DIAGNOSIS — S70.01XA CONTUSION OF RIGHT HIP, INITIAL ENCOUNTER: ICD-10-CM

## 2024-10-26 PROCEDURE — 73030 X-RAY EXAM OF SHOULDER: CPT | Performed by: RADIOLOGY

## 2024-10-26 PROCEDURE — 73030 X-RAY EXAM OF SHOULDER: CPT

## 2024-10-26 PROCEDURE — 73502 X-RAY EXAM HIP UNI 2-3 VIEWS: CPT

## 2024-10-26 PROCEDURE — 73502 X-RAY EXAM HIP UNI 2-3 VIEWS: CPT | Performed by: RADIOLOGY

## 2024-10-26 PROCEDURE — 99283 EMERGENCY DEPT VISIT LOW MDM: CPT

## 2024-10-26 NOTE — DISCHARGE INSTRUCTIONS

## 2024-10-26 NOTE — ED PROVIDER NOTES
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  Nursing reports patient was walking down to the right side.  They are concerned about right hip and right shoulder pain.  Patient had only mumbles but is able to answer no yes/no questions.  She does respond yes to having shoulder pain.  She denies any hip pain currently.    Fall      Review of Systems   Constitutional: Negative.    HENT: Negative.     Eyes: Negative.    Respiratory: Negative.     Cardiovascular: Negative.    Gastrointestinal: Negative.    Endocrine: Negative.    Genitourinary: Negative.    Musculoskeletal: Negative.    Skin: Negative.    Allergic/Immunologic: Negative.    Neurological: Negative.    Hematological: Negative.    Psychiatric/Behavioral: Negative.         Past Medical History:   Diagnosis Date    Dementia        Allergies   Allergen Reactions    Lisinopril Hives       Past Surgical History:   Procedure Laterality Date    AUGMENTATION MAMMAPLASTY Bilateral     BREAST AUGMENTATION       SECTION      EYE SURGERY Right 2004    prostetic right eye    GALLBLADDER SURGERY      HYSTERECTOMY      TONSILLECTOMY         Family History   Problem Relation Age of Onset    Breast cancer Maternal Aunt        Social History     Socioeconomic History    Marital status:    Tobacco Use    Smoking status: Never    Smokeless tobacco: Never           Objective   Physical Exam  Vitals and nursing note reviewed.   Constitutional:       Appearance: She is well-developed.   HENT:      Head: Normocephalic.      Right Ear: External ear normal.      Left Ear: External ear normal.   Eyes:      Conjunctiva/sclera: Conjunctivae normal.      Pupils: Pupils are equal, round, and reactive to light.   Cardiovascular:      Rate and Rhythm: Normal rate and regular rhythm.      Heart sounds: Normal heart sounds.   Pulmonary:      Effort: Pulmonary effort is normal.      Breath sounds: Normal breath sounds.    Abdominal:      General: Bowel sounds are normal.      Palpations: Abdomen is soft.   Musculoskeletal:         General: Normal range of motion.      Cervical back: Normal range of motion and neck supple.   Skin:     General: Skin is warm and dry.      Capillary Refill: Capillary refill takes less than 2 seconds.   Neurological:      Mental Status: She is alert and oriented to person, place, and time.   Psychiatric:         Behavior: Behavior normal.         Thought Content: Thought content normal.         Procedures       XR Hip With or Without Pelvis 2 - 3 View Right   Final Result   1.  No acute fracture or dislocation.   2.  Joint space narrowing in the right hip.   3.  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           This report was finalized on 10/26/2024 12:56 PM by Dr. Blue Coley MD.          XR Shoulder 2+ View Right   Final Result     No acute findings in the right shoulder.           This report was finalized on 10/26/2024 12:27 PM by Dr. Blue Coley MD.                ED Course                                               Medical Decision Making  MDM:    Escalation of care including admission/observation considered    - Discussions of management with other providers:  None    - Discussed/reviewed with Radiology regarding test interpretation    - Independent interpretation: None    - Additional patient history obtained from: None    - Review of external non-ED record (if available):  Prior Inpt record, Office record, Outpt record, Prior Outpt labs, PCP record, Outside ED record, Other    - Chronic conditions affecting care: See HPI and medical Hx.    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  Nursing reports patient was walking down to the right side.  They are concerned about right hip and right shoulder pain.  Patient had only mumbles but  is able to answer no yes/no questions.  She does respond yes to having shoulder pain.  She denies any hip pain currently.      The patient has been given very strict return precautions to return to the emergency department should there be any acute change or worsening of their condition.  I have explained my findings and the patient has expressed understanding to me.  I explained that the work-up performed in the ED has been based on the specific complaint and concern, as the nature of emergency medicine is complaint driven and they understand that new symptoms may arise.  I have told them that, should there be any new symptoms, worsening or changing symptoms, a new work-up may be indicated that they are encouraged to return to the emergency department or promptly contact their primary care physician. We have employed a shared decision-making process as the discussion of their disposition.  The patient has been educated as to the nature of the visit, the tests and work-up performed and the findings from today's visit. At this time, there does not appear to be any acute emergent process that necessitates admission to the hospital, however, the patient understands that this can change unexpectedly. At this time, the patient is stable for discharge home and agrees to follow-up with her primary care physician in the next 24 to 48 hours or earlier should they be able to obtain an appointment.    The patient was counseled regarding diagnostic results and treatment plan and patient has indicated understanding of these instructions.      Problems Addressed:  Contusion of right hip, initial encounter: complicated acute illness or injury  Contusion of right shoulder, initial encounter: complicated acute illness or injury    Amount and/or Complexity of Data Reviewed  Radiology: ordered. Decision-making details documented in ED Course.        Final diagnoses:   Contusion of right shoulder, initial encounter   Contusion of right  hip, initial encounter       ED Disposition  ED Disposition       ED Disposition   Discharge    Condition   Stable    Comment   --               Noemi Tracy, APRN  [id number] [facility] [address]  [facility] [address]  [phone]    Schedule an appointment as soon as possible for a visit   For further evaluation         Medication List      No changes were made to your prescriptions during this visit.            Sundeep Jaffe, APRN  10/26/24 [id number]